# Patient Record
Sex: MALE | Race: WHITE | Employment: FULL TIME | URBAN - METROPOLITAN AREA
[De-identification: names, ages, dates, MRNs, and addresses within clinical notes are randomized per-mention and may not be internally consistent; named-entity substitution may affect disease eponyms.]

---

## 2020-11-20 ENCOUNTER — OFFICE VISIT (OUTPATIENT)
Dept: PRIMARY CARE CLINIC | Age: 25
End: 2020-11-20
Payer: COMMERCIAL

## 2020-11-20 VITALS
DIASTOLIC BLOOD PRESSURE: 68 MMHG | BODY MASS INDEX: 20.54 KG/M2 | OXYGEN SATURATION: 99 % | HEIGHT: 73 IN | SYSTOLIC BLOOD PRESSURE: 110 MMHG | TEMPERATURE: 98 F | RESPIRATION RATE: 16 BRPM | WEIGHT: 155 LBS | HEART RATE: 87 BPM

## 2020-11-20 DIAGNOSIS — Z20.822 SUSPECTED COVID-19 VIRUS INFECTION: ICD-10-CM

## 2020-11-20 LAB
Lab: NORMAL
QC PASS/FAIL: NORMAL
SARS-COV-2, POC: NORMAL

## 2020-11-20 PROCEDURE — 87426 SARSCOV CORONAVIRUS AG IA: CPT | Performed by: PHYSICIAN ASSISTANT

## 2020-11-20 PROCEDURE — 4004F PT TOBACCO SCREEN RCVD TLK: CPT | Performed by: PHYSICIAN ASSISTANT

## 2020-11-20 PROCEDURE — G8484 FLU IMMUNIZE NO ADMIN: HCPCS | Performed by: PHYSICIAN ASSISTANT

## 2020-11-20 PROCEDURE — G8427 DOCREV CUR MEDS BY ELIG CLIN: HCPCS | Performed by: PHYSICIAN ASSISTANT

## 2020-11-20 PROCEDURE — 99213 OFFICE O/P EST LOW 20 MIN: CPT | Performed by: PHYSICIAN ASSISTANT

## 2020-11-20 PROCEDURE — G8420 CALC BMI NORM PARAMETERS: HCPCS | Performed by: PHYSICIAN ASSISTANT

## 2020-11-20 RX ORDER — DEXTROMETHORPHAN HYDROBROMIDE AND PROMETHAZINE HYDROCHLORIDE 15; 6.25 MG/5ML; MG/5ML
5 SYRUP ORAL EVERY 6 HOURS PRN
Qty: 120 ML | Refills: 0 | Status: SHIPPED
Start: 2020-11-20 | End: 2022-04-29

## 2020-11-20 NOTE — LETTER
[address] [place] [address]  Antony Diallo  Phone: [phone]  Fax: [phone]    Rey Dickerson. Jacqui Huntley PA-C      11/20/2020     Patient: Beatriz Mcdaniel   YOB: 1995       To Whom It May Concern: It is my medical opinion that Beatriz Mcdaniel should remain out of work while acutely ill and awaiting COVID-19 test results. Return to work with no retesting should be followed if test is negative AND meets these 3 criteria as outlined by CDC/ODH:     a. No fever without the use of fever reducers for 24 hours  b. Improvement in symptoms  c. At least 7 days since the onset of symptoms (11/27). If tests positive for COVID-19, needs minimum of 10 days strict quarantine, improvement of symptoms and 24 hours fever free without fever reducing medications. If you have any questions or concerns, please don't hesitate to call. Sincerely,        Rey Dickerson.  NEGRITO Lew

## 2020-11-20 NOTE — PROGRESS NOTES
Chief Complaint   Cough (productive x 4 days); Other (loss of taste and smell); and Chest Congestion (rhinorrhea)      History of Present Illness   Source of history provided by: patient. Janine Hollis is a 30 y.o. old male who has a past medical history of: History reviewed. No pertinent past medical history. Presents to the flu clinic for evaluation of productive cough, chest congestion, rhinorrhea, and loss of taste and smell x4 days. Patient denies any known exposure to COVID-19. Denies any history of asthma or tobacco use. Denies any fever, chills, CP, dyspnea, LE edema, abdominal pain, vomiting, rash, or lethargy. ROS   Pertinent positives and negatives are stated within HPI, all other systems reviewed and are negative. Surgical History:  has no past surgical history on file. Social History:    Family History: family history is not on file. Allergies: Patient has no known allergies. Physical Exam      VS:  /68   Pulse 87   Temp 98 °F (36.7 °C) (Temporal)   Resp 16   Ht 6' 0.5\" (1.842 m)   Wt 155 lb (70.3 kg)   SpO2 99%   BMI 20.73 kg/m²    Oxygen Saturation Interpretation: Normal.    Constitutional:  Alert, development consistent with age. NAD. Head:  NC/NT. Airway patent. Mouth: Posterior pharynx with mild erythema and clear postnasal drip. No tonsillar hypertrophy or exudate. Neck:  Normal ROM. Supple. No anterior cervical adenopathy noted. Lungs: CTAB without wheezes, rales, or rhonchi. CV:  Regular rate and rhythm, normal heart sounds, without pathological murmurs, ectopy, gallops, or rubs. Skin:  Normal turgor. Warm, dry, without visible rash. Lymphatic: No lymphangitis or adenopathy noted. Neurological:  Oriented. Motor functions intact. Lab / Imaging Results   (All laboratory and radiology results have been personally reviewed by myself)  Labs:  No results found for this visit on 11/20/20. Imaging:   All Radiology results interpreted by Radiologist unless otherwise noted. No results found. Medical Decision Making   Pt non-toxic, in no apparent distress and stable at time of discharge. Assessment/Plan   Pratima Fermin was seen today for cough, other and chest congestion. Diagnoses and all orders for this visit:    Suspected COVID-19 virus infection  -     POCT COVID-19, Antigen  -     COVID-19 Ambulatory; Future    Other orders  -     promethazine-dextromethorphan (PROMETHAZINE-DM) 6.25-15 MG/5ML syrup; Take 5 mLs by mouth every 6 hours as needed for Cough      Rapid COVID-19 testing is negative in office. Confirmatory PCR swab obtained and pending, will call with results once available. Prescription written for Promethazine DM cough syrup, side effects discussed. Advised cautionary self-quarantine at home in the interim. Pt should remain out of work for at least 10 days from the start of symptoms. Pt should also be fever free for 24 hours and symptoms should be improved overall prior to returning. Increase fluids and rest. Symptomatic relief discussed including Tylenol prn pain/fever. Schedule virtual f/u with PCP in 7-10 days if symptoms persist. ED sooner if symptoms worsen or change. ED immediately with high or refractory fever, progressive SOB, dyspnea, CP, calf pain/swelling, shaking chills, vomiting, abdominal pain, lethargy, flank pain, or decreased urinary output. Pt verbalizes understanding and is in agreement with plan of care. All questions answered. Juan Lew PA-C    This visit was provided as a focused evaluation during the COVID -19 pandemic/national emergency. A comprehensive review of all previous patient history and testing was not conducted. Pertinent findings were elicited during the visit.

## 2020-11-21 LAB
SARS-COV-2: NOT DETECTED
SOURCE: NORMAL

## 2022-03-24 ENCOUNTER — APPOINTMENT (OUTPATIENT)
Dept: GENERAL RADIOLOGY | Age: 27
End: 2022-03-24
Payer: COMMERCIAL

## 2022-03-24 ENCOUNTER — HOSPITAL ENCOUNTER (EMERGENCY)
Age: 27
Discharge: HOME | End: 2022-03-24
Payer: COMMERCIAL

## 2022-03-24 VITALS
BODY MASS INDEX: 19.64 KG/M2 | TEMPERATURE: 98.6 F | SYSTOLIC BLOOD PRESSURE: 136 MMHG | DIASTOLIC BLOOD PRESSURE: 88 MMHG | HEART RATE: 99 BPM | OXYGEN SATURATION: 95 % | WEIGHT: 145 LBS | RESPIRATION RATE: 16 BRPM | HEIGHT: 72 IN

## 2022-03-24 DIAGNOSIS — S83.91XA SPRAIN OF RIGHT KNEE, UNSPECIFIED LIGAMENT, INITIAL ENCOUNTER: ICD-10-CM

## 2022-03-24 DIAGNOSIS — S82.141A CLOSED FRACTURE OF RIGHT TIBIAL PLATEAU, INITIAL ENCOUNTER: Primary | ICD-10-CM

## 2022-03-24 PROCEDURE — 99283 EMERGENCY DEPT VISIT LOW MDM: CPT

## 2022-03-24 PROCEDURE — 6370000000 HC RX 637 (ALT 250 FOR IP): Performed by: NURSE PRACTITIONER

## 2022-03-24 PROCEDURE — 73564 X-RAY EXAM KNEE 4 OR MORE: CPT

## 2022-03-24 RX ORDER — NAPROXEN 500 MG/1
500 TABLET ORAL 2 TIMES DAILY WITH MEALS
Qty: 14 TABLET | Refills: 0 | Status: SHIPPED | OUTPATIENT
Start: 2022-03-24 | End: 2022-07-20

## 2022-03-24 RX ORDER — NAPROXEN 500 MG/1
500 TABLET ORAL ONCE
Status: COMPLETED | OUTPATIENT
Start: 2022-03-24 | End: 2022-03-24

## 2022-03-24 RX ADMIN — NAPROXEN 500 MG: 500 TABLET ORAL at 18:33

## 2022-03-24 ASSESSMENT — PAIN DESCRIPTION - PROGRESSION: CLINICAL_PROGRESSION: NOT CHANGED

## 2022-03-24 ASSESSMENT — PAIN DESCRIPTION - PAIN TYPE: TYPE: ACUTE PAIN

## 2022-03-24 ASSESSMENT — PAIN SCALES - GENERAL
PAINLEVEL_OUTOF10: 7
PAINLEVEL_OUTOF10: 7

## 2022-03-24 ASSESSMENT — PAIN - FUNCTIONAL ASSESSMENT: PAIN_FUNCTIONAL_ASSESSMENT: 0-10

## 2022-03-24 ASSESSMENT — PAIN DESCRIPTION - LOCATION: LOCATION: KNEE

## 2022-03-24 ASSESSMENT — PAIN DESCRIPTION - DESCRIPTORS: DESCRIPTORS: ACHING

## 2022-03-24 ASSESSMENT — PAIN DESCRIPTION - ONSET: ONSET: ON-GOING

## 2022-03-24 ASSESSMENT — PAIN DESCRIPTION - ORIENTATION: ORIENTATION: RIGHT

## 2022-03-24 ASSESSMENT — PAIN DESCRIPTION - FREQUENCY: FREQUENCY: CONTINUOUS

## 2022-03-24 NOTE — ED PROVIDER NOTES
[address]  Department of Emergency Medicine   ED  Encounter Note  Admit Date/RoomTime: 3/24/2022  5:56 PM  ED Room: [facility]    NAME: Uriah Keen  : 1995  MRN: [id number]     Chief Complaint:  Knee Injury (during basket ball today injured right knee rates pain 7)    History of Present Illness       Uriah Keen is a 27 y.o. old male who presents to the emergency department for evaluation of right knee pain that occurred about an hour prior to arrival while playing basketball. Patient states he was passing to the left and pivoting to the right when he felt a pop in his right knee. He denies a history of fracture, dislocation or surgical intervention of this knee in the past.  He has not had any over-the-counter intervention for his pain prior to arrival.  His pain is aggravated by ambulation and movement. He denies any other injury associated with this. His symptoms are moderate in severity and persistent nature. ROS   Pertinent positives and negatives are stated within HPI, all other systems reviewed and are negative. Past Medical History:  has no past medical history on file. Surgical History:  has no past surgical history on file. Social History:  reports that he has been smoking. He has never used smokeless tobacco. He reports previous alcohol use. He reports that he does not use drugs. Family History: family history is not on file. Allergies: Patient has no known allergies. Physical Exam   Oxygen Saturation Interpretation: Normal.        ED Triage Vitals   BP Temp Temp Source Pulse Resp SpO2 Height Weight   22 1753 22 1753 22 1753 22 1753 22 1753 22 1753 22 1757 22 1757   (!) 151/100 98.6 °F (37 °C) Oral 99 16 95 % 6' (1.829 m) 145 lb (65.8 kg)         Constitutional:  Alert, development consistent with age. HEENT:  NC/NT. No outward sign of trauma. Airway patent.   Neck:  Normal ROM.  Supple. Respiratory: No respiratory distress or increased work of breathing. Cardiovascular: Regular rate and rhythm. Strong distal pulses. Right Lower Extremity(s): knee              Tenderness: Moderate tenderness at the lower medial and lateral aspects of the knee as well as the tibial plateau. There is no bony tenderness to the right hip, thigh, mid tib-fib, ankle or foot. There is no posterior leg tenderness. No pain at the Achilles tendon. Swelling: Mild at the lateral aspect of the knee. Calf:  No evidence of DVT seen on physical exam. No cords or calf tenderness. No significant calf/ankle edema. .             Deformity: no deformity observed/palpated. ROM: Negative anterior and posterior drawer. Guarded range of motion secondary to pain. Skin:  no wounds, erythema, or ecchymosis. Neurovascular: Motor deficit: none. Sensory deficit: none. Pulse deficit: none. Capillary refill: normal.  Gait:  limp due to affected limb. Neurological:  Alert and oriented. Motor and sensory functions intact unless otherwise described above. Lab / Imaging Results   (All laboratory and radiology results have been personally reviewed by myself)  Labs:  No results found for this visit on 03/24/22. Imaging: All Radiology results interpreted by Radiologist unless otherwise noted. XR KNEE RIGHT (MIN 4 VIEWS)   Final Result   1. Possible cortical avulsion at the lateral aspect of the lateral tibial   plateau. Recommend correlation for point tenderness in this region. 2. Suprapatellar knee joint effusion. ED Course / Medical Decision Making     Medications   naproxen (NAPROSYN) tablet 500 mg (500 mg Oral Given 3/24/22 1833)          Re-examination:  3/24/22     Time: 1950  Patients symptoms are improving. Repeat physical examination is unchanged.   Discussed results and treatment plan.    Consults:   Time: 1950 Discussed results with Dr. Shyanne Abraham and plan is for knee immobilizer. Procedure(s):  None    MDM:      Neurovascularly intact. Imaging was obtained based as per history and physical exam findings. Interpretation as per radiologist positive for acute findings as documented above. Plan is subsequently for symptom control with knee immobilizer, crutches, anti-inflammatories as he declines opiate medications, rest, ice, elevation and with appropriate outpatient follow-up with orthopedics as provided on their discharge handout. Patient is educated on S/S to watch for indicative of re-evaluation in the ER setting including worsening of current symptoms not responding to the treatment plan. Patient verbalized understanding of instructions and is amenable to this treatment plan. Patient departed in stable condition. Plan of Care/Counseling:  MYLA Hu CNP reviewed today's visit with the patient in addition to providing specific details for the plan of care and counseling regarding the diagnosis and prognosis. Questions are answered at this time and are agreeable with the plan. Assessment      1. Closed fracture of right tibial plateau, initial encounter    2. Sprain of right knee, unspecified ligament, initial encounter      Plan   Discharged home. Patient condition is stable    New Medications     New Prescriptions    NAPROXEN (NAPROSYN) 500 MG TABLET    Take 1 tablet by mouth 2 times daily (with meals) for 7 days     Electronically signed by MYLA Hu CNP   DD: 3/24/22  **This report was transcribed using voice recognition software. Every effort was made to ensure accuracy; however, inadvertent computerized transcription errors may be present.   END OF ED PROVIDER NOTE       MYLA Hu CNP  03/24/22 1954

## 2022-03-24 NOTE — ED NOTES
Ace wrap applied to right knee, good circulation, cap refill < 3 seconds     Cuauhtemoc Macdonald, KEELY  03/24/22 2030

## 2022-03-25 ENCOUNTER — TELEPHONE (OUTPATIENT)
Dept: ORTHOPEDIC SURGERY | Age: 27
End: 2022-03-25

## 2022-03-25 NOTE — TELEPHONE ENCOUNTER
ED f/u 3/24/22 TTS  Chief Complaint:  Knee Injury (during basket ball today injured right knee rates pain 7)  Imaging: All Radiology results interpreted by Radiologist unless otherwise noted. XR KNEE RIGHT (MIN 4 VIEWS)   Final Result   1. Possible cortical avulsion at the lateral aspect of the lateral tibial   plateau. Recommend correlation for point tenderness in this region. 2. Suprapatellar knee joint effusion. Routing to provider for scheduling rec.

## 2022-03-25 NOTE — TELEPHONE ENCOUNTER
Call to pt left message w/Grandma scheduled appt /gave directions to office.   Future Appointments   Date Time Provider Josafat Montenegro   3/29/2022 10:00 AM Luis Enrique Escobar MD [facility]

## 2022-03-25 NOTE — TELEPHONE ENCOUNTER
Pt called in to [facility] an ED F/U for a fx of RT tibial plateau. Alexandra Benavides can be reached at [phone]. Thank you.

## 2022-03-29 ENCOUNTER — OFFICE VISIT (OUTPATIENT)
Dept: ORTHOPEDIC SURGERY | Age: 27
End: 2022-03-29
Payer: COMMERCIAL

## 2022-03-29 DIAGNOSIS — M23.91 INTERNAL DERANGEMENT OF MULTIPLE SITES OF RIGHT KNEE: Primary | ICD-10-CM

## 2022-03-29 PROCEDURE — 99203 OFFICE O/P NEW LOW 30 MIN: CPT | Performed by: ORTHOPAEDIC SURGERY

## 2022-03-29 PROCEDURE — G8427 DOCREV CUR MEDS BY ELIG CLIN: HCPCS | Performed by: ORTHOPAEDIC SURGERY

## 2022-03-29 PROCEDURE — G8484 FLU IMMUNIZE NO ADMIN: HCPCS | Performed by: ORTHOPAEDIC SURGERY

## 2022-03-29 PROCEDURE — 99202 OFFICE O/P NEW SF 15 MIN: CPT | Performed by: ORTHOPAEDIC SURGERY

## 2022-03-29 PROCEDURE — G8420 CALC BMI NORM PARAMETERS: HCPCS | Performed by: ORTHOPAEDIC SURGERY

## 2022-03-29 PROCEDURE — 4004F PT TOBACCO SCREEN RCVD TLK: CPT | Performed by: ORTHOPAEDIC SURGERY

## 2022-03-29 NOTE — PATIENT INSTRUCTIONS
Work on gentle range of motion of the right knee    Baby aspirin daily for clotting prophylaxis    Ice and elevate and NSAIDs for comfort    MRI scheduled    You were ordered MRI of Right knee without contrast by your Orthopedic Provider. If you do not hear from that department please contact: MRI- [phone]    Please make sure your follow up appointment in office is scheduled shortly after the above testing is complete. If your testing is completed significantly sooner than planned follow up contact office for appointment adjustment.

## 2022-03-29 NOTE — PROGRESS NOTES
Chief Complaint   Patient presents with    Knee Pain     right knee; patient was playing basketball and did a move and felt his knee try and go in two different directions; patient felt a pop and fell to the ground; doi: 3/24/2022; no previous injury to this knee; 3/10 pain; patient states he has remained non weight bearing; using crutches to ambulate    Other     patient works at [employer]; patient will need a note for work       SUBJECTIVE: Patient is a very pleasant 64-year-old male comes in today with chief complaint of right knee pain. He is accompanied by his mother today. He states he was playing basketball on 3/24/2022 when he felt a pop in his right knee when he was pivoting. He states that the knee became very sore he had difficulty ambulating very well now. He can bear weight although it hurts. He was seen in the emergency department placed in the immobilizer and sent to us for definitive management. His x-rays emergency department showed a possible capsular avulsion on the lateral side of his tibial plateau. He states the swelling has been resolving nicely. He denies any further injury. He denies any previous injury in the past.  His mother does note that his brother and she have a genetic clotting disorder. History reviewed. No pertinent past medical history. History reviewed. No pertinent surgical history. History reviewed. No pertinent family history. Social History     Tobacco Use    Smoking status: Current Every Day Smoker     Packs/day: 1.00     Types: Cigarettes    Smokeless tobacco: Never Used   Substance Use Topics    Alcohol use: Yes     Comment: patient states he drinks a couple drinks every night    Drug use: Yes     Frequency: 2.0 times per week     Types: Marijuana (Weed)     No Known Allergies\      Review of Systems   Constitutional: Negative for fever, chills, diaphoresis, appetite change and fatigue.    HENT: Negative for dental issues, hearing loss and tinnitus. Negative for congestion, sinus pressure, sneezing, sore throat. Negative for headache. Eyes: Negative for visual disturbance, blurred and double vision. Negative for pain, discharge, redness and itching  Respiratory: Negative for cough, shortness of breath and wheezing. Cardiovascular: Negative for chest pain, palpitations and leg swelling. No dyspnea on exertion   Gastrointestinal:   Negative for nausea, vomiting, abdominal pain, diarrhea, constipation  or black or bloody. Hematologic\Lymphatic:  negative for bleeding, petechiae,   Genitourinary: Negative for hematuria and difficulty urinating. Musculoskeletal: Negative for neck pain and stiffness. Negative for back pain, see HPI  Skin: Negative for pallor, rash and wound. Neurological: Negative for dizziness, tremors, seizures, weakness, light-headedness, no TIA or stroke symptoms. No numbness and headaches. Psychiatric/Behavioral: Negative. OBJECTIVE:      Physical Examination:   General appearance: alert, well appearing, and in no distress,  normal appearing weight. No visible signs of trauma   Mental status: alert, oriented to person, place, and time, normal mood, behavior, speech, dress, motor activity, and thought processes  Abdomen: soft, nondistended  Resp:   resp easy and unlabored, no audible wheezes note, normal symmetrical expansion of both hemithoraces  Cardiac: distal pulses palpable, skin and extremities well perfused  Neurological: alert, oriented X3, normal speech, no focal findings or movement disorder noted, motor and sensory grossly normal bilaterally, normal muscle tone, no tremors, strength 5/5, normal gait and station  HEENT: normochephalic atraumatic, external ears and eyes normal, sclera normal, neck supple, no nasal discharge.    Extremities:   peripheral pulses normal, no edema, redness or tenderness in the calves   Skin: normal coloration, no rashes or open wounds, no suspicious skin lesions noted  Psych: Affect euthymic   Musculoskeletal:   Extremity:  Right knee   Skin intact. Mild effusion noted. Lateral joint line TTP. Some tenderness to palpation of the MCL insertion   Stable to varus and valgus at 30 degrees of flexion. Does feel little looser on the lateral collateral ligament side   Patient does have more laxity on the right knee as far as anterior draw than the left but does have an endpoint. Right knee is stable   Compartments soft and compressible. NVID. 2/4 DP and PT pulses. Patella tracks nicely no crepitus on range of motion   Calves soft and NT.     5/5 EHL, TA, GS. Range of motion is 0 to 90 degrees    There were no vitals taken for this visit. XR: The right knee from the emergency department is reviewed today. Does show possible bony avulsion at the lateral tibial plateau and an effusion. No other obvious fractures or dislocations. ASSESSMENT:     Diagnosis Orders   1. Internal derangement of multiple sites of right knee  MRI KNEE RIGHT WO CONTRAST        Discussion: Had a long discussion with the patient and his mother in detail about the fact that he does feel he has some laxity in his ACL also has a very small joint effusion. With the tenderness along his MCL and some lateral joint line tenderness I like to get an MRI for internal derangement of his knee. This will look at his ACL and menisci as well as his lateral collateral ligaments. She understands that these may have injury. In the meantime told to work on gentle range of motion, the knee immobilizer so he does not get a stiff knee. We will start him on baby aspirin daily for DVT prophylaxis. He is agreeable with the plan.     PLAN:    MRI right knee    Ice and elevate    Work on gentle range of motion right knee come out of immobilizer    Baby aspirin daily for DVT prophylaxis    We will call him if ACL torn will refer to sports otherwise we will see him back in the office for MRI results    ELECTRONICALLY signed by:    Gian Barkley MD  3/29/22

## 2022-03-29 NOTE — LETTER
[address]  [address]  [address] [facility] [address]  Phone: [phone]  Fax: [phone] Nabeel Garcia MD        March 29, 2022     Patient: Bethanie Lennox   YOB: 1995   Date of Visit: 3/29/2022       To Whom It May Concern:    Bethanie Lennox was seen in my office today. It is my medical opinion that Bethanie Lennox should remain out of work until re-evaluated in office after MRI is completed. If you have any questions or concerns, please don't hesitate to call.     Sincerely,          Larisa Gonsalez MD

## 2022-04-15 ENCOUNTER — HOSPITAL ENCOUNTER (OUTPATIENT)
Dept: GENERAL RADIOLOGY | Age: 27
Discharge: HOME | End: 2022-04-17
Payer: COMMERCIAL

## 2022-04-15 ENCOUNTER — HOSPITAL ENCOUNTER (OUTPATIENT)
Dept: MRI IMAGING | Age: 27
Discharge: HOME | End: 2022-04-17
Payer: COMMERCIAL

## 2022-04-15 DIAGNOSIS — T15.90XA FOREIGN BODY IN EYE, UNSPECIFIED LATERALITY, INITIAL ENCOUNTER: ICD-10-CM

## 2022-04-15 DIAGNOSIS — M23.91 INTERNAL DERANGEMENT OF MULTIPLE SITES OF RIGHT KNEE: ICD-10-CM

## 2022-04-15 PROCEDURE — 70030 X-RAY EYE FOR FOREIGN BODY: CPT

## 2022-04-15 PROCEDURE — 73721 MRI JNT OF LWR EXTRE W/O DYE: CPT

## 2022-04-21 ENCOUNTER — TELEPHONE (OUTPATIENT)
Dept: ADMINISTRATIVE | Age: 27
End: 2022-04-21

## 2022-04-21 DIAGNOSIS — S83.519A ACUTE TEAR OF ANTERIOR CRUCIATE LIGAMENT OF KNEE: Primary | ICD-10-CM

## 2022-04-21 NOTE — TELEPHONE ENCOUNTER
MRi right knee completed 04/15/22    MRI knee right wo contrast    Impression   1. Acute ACL tear. 2. Suspected tear in the posterior horn lateral meniscus. 3. Grade 1 sprain of the LCL. 4. Bone contusions in the femoral condyles and posterior tibial plateau. Additional bone contusion in the proximal fibula. Per last office visit note, \"We will call him if ACL torn will refer to sports otherwise we will see him back in the office for MRI results\"    Referral to Dr. Yo Em pended and routed.

## 2022-04-21 NOTE — TELEPHONE ENCOUNTER
Referral to Dr. Arzola Lisseth placed.  Call placed to patient, family member(?) answered phone, instructed pt to call office back to discuss results and referral.

## 2022-04-29 ENCOUNTER — OFFICE VISIT (OUTPATIENT)
Dept: ORTHOPEDIC SURGERY | Age: 27
End: 2022-04-29
Payer: COMMERCIAL

## 2022-04-29 VITALS — BODY MASS INDEX: 19.88 KG/M2 | WEIGHT: 150 LBS | HEIGHT: 73 IN

## 2022-04-29 DIAGNOSIS — S83.511A RUPTURE OF ANTERIOR CRUCIATE LIGAMENT OF RIGHT KNEE, INITIAL ENCOUNTER: Primary | ICD-10-CM

## 2022-04-29 PROCEDURE — 99204 OFFICE O/P NEW MOD 45 MIN: CPT | Performed by: ORTHOPAEDIC SURGERY

## 2022-04-29 PROCEDURE — G8427 DOCREV CUR MEDS BY ELIG CLIN: HCPCS | Performed by: ORTHOPAEDIC SURGERY

## 2022-04-29 PROCEDURE — 4004F PT TOBACCO SCREEN RCVD TLK: CPT | Performed by: ORTHOPAEDIC SURGERY

## 2022-04-29 PROCEDURE — G8420 CALC BMI NORM PARAMETERS: HCPCS | Performed by: ORTHOPAEDIC SURGERY

## 2022-04-29 NOTE — PROGRESS NOTES
New Knee Patient     Referring Provider:   No referring provider defined for this encounter. CHIEF COMPLAINT:   Chief Complaint   Patient presents with    New Patient     R knee ACL injury. Pt was playing basketball 1 month ago, went to pivot and R knee popped and he fell. Had MRI by Dr. Emmanuel Davis showing ACL tear.  Knee Pain     R knee clicking, instability         HPI:    Leeanna Ceja is a 27y.o. year old male who injured his right knee playing basketball about a month ago. He had an MRI which shows an ACL tear. He is here to discuss further treatment. The patient is currently employed at [employer], he has not been working since his injury. He denies any prior knee pain    PAST MEDICAL HISTORY  No past medical history on file. PAST SURGICAL HISTORY  No past surgical history on file. FAMILY HISTORY   No family history on file. SOCIAL HISTORY  Social History     Socioeconomic History    Marital status: Single     Spouse name: Not on file    Number of children: Not on file    Years of education: Not on file    Highest education level: Not on file   Occupational History    Not on file   Tobacco Use    Smoking status: Current Every Day Smoker     Packs/day: 1.00     Types: Cigarettes    Smokeless tobacco: Never Used   Substance and Sexual Activity    Alcohol use: Yes     Comment: patient states he drinks a couple drinks every night    Drug use: Yes     Frequency: 2.0 times per week     Types: Marijuana Natividad Beat)    Sexual activity: Not on file   Other Topics Concern    Not on file   Social History Narrative    Not on file     Social Determinants of Health     Financial Resource Strain:     Difficulty of Paying Living Expenses: Not on file   Food Insecurity:     Worried About Running Out of Food in the Last Year: Not on file    Romy of Food in the Last Year: Not on file   Transportation Needs:     Lack of Transportation (Medical):  Not on file    Lack of Transportation (Non-Medical):  Not on file   Physical Activity:     Days of Exercise per Week: Not on file    Minutes of Exercise per Session: Not on file   Stress:     Feeling of Stress : Not on file   Social Connections:     Frequency of Communication with Friends and Family: Not on file    Frequency of Social Gatherings with Friends and Family: Not on file    Attends Restorationist Services: Not on file    Active Member of [address] [place] [address] [employer] or Organizations: Not on file    Attends Club or Organization Meetings: Not on file    Marital Status: Not on file   Intimate Partner Violence:     Fear of Current or Ex-Partner: Not on file    Emotionally Abused: Not on file    Physically Abused: Not on file    Sexually Abused: Not on file   Housing Stability:     Unable to Pay for Housing in the Last Year: Not on file    Number of [place] in the Last Year: Not on file    Unstable Housing in the Last Year: Not on file     Social History     Occupational History    Not on file   Tobacco Use    Smoking status: Current Every Day Smoker     Packs/day: 1.00     Types: Cigarettes    Smokeless tobacco: Never Used   Substance and Sexual Activity    Alcohol use: Yes     Comment: patient states he drinks a couple drinks every night    Drug use: Yes     Frequency: 2.0 times per week     Types: Marijuana Christine Dasen)    Sexual activity: Not on file       CURRENT MEDICATIONS     Current Outpatient Medications:     naproxen (NAPROSYN) 500 MG tablet, Take 1 tablet by mouth 2 times daily (with meals) for 7 days (Patient not taking: Reported on 4/29/2022), Disp: 14 tablet, Rfl: 0    ALLERGIES  No Known Allergies    Controlled Substances Monitoring:          REVIEW OF SYSTEMS:     Constitutional:  Negative for weight loss, fevers, chills, fatigue  Cardiovascular: Negative for chest pain, palpitations  Pulmonary: Negative for shortness of breath, labored breathing, cough  GI: negative for abdominal pain, nausea, vomitting   MSK: per HPI  Skin: negative for rash, open wounds    All other systems reviewed and are negative         PHYSICAL EXAM     Vitals:    04/29/22 1034   Weight: 150 lb (68 kg)   Height: 6' 1\" (1.854 m)       Height: 6' 1\" (1.854 m)  Weight: [unfilled]  BMI:  Body mass index is 19.79 kg/m². General: The patient is alert and oriented x 3, appears to be stated age and in no distress. HEENT: head is normocephalic, atraumatic. EOMI. Neck: supple, trachea midline, no thyromegaly   Cardiovascular: peripheral pulses palpable. Normal Capillary refill   Respiratory: breathing unlabored, chest expansion symmetric   Skin: no rash, no open wounds, no erythema  Psych: normal affect; mood stable  Neurologic: gait normal, sensation grossly intact in extremities  MSK:        Lower Extremity:   Ipsilateral hip exam shows normal range of motion without pain with impingement testing. Exam of the knee today shows positive Lachman grade 2B. There is mild guarding, mild pain with full extension. Flexion is to 120 degrees, mild pain past this point. Knee is stable to varus and valgus at 0 and 30 degrees. Posterior drawer stable. IMAGING:    XR: Right knee. X-rays show no acute abnormality. MRI shows midsubstance ACL tear, some bone bruising about the femur and tibia, grossly intact collateral and ligaments and PCL as well as no obvious meniscus tear      ASSESSMENT  Right knee ACL tear    PLAN  We discussed his knee today. We went over his MRI and exam which correlate with ACL injury. Given his young age and activity level he was offered surgical management. We discussed this would involve ACL reconstruction. We also discussed nonoperative treatment, however we did discuss risk of further injuring his knee with continued instability. He is leaning towards proceeding with surgery. He was given a copy of the ACL booklet today and he will call us if he would like to proceed with scheduling.   Surgery would involve right knee arthroscopy, ACL reconstruction with autograft.         Glory Maynard MD  Orthopaedic Surgery   4/29/22  10:35 AM

## 2022-05-10 ENCOUNTER — TELEPHONE (OUTPATIENT)
Dept: ORTHOPEDIC SURGERY | Age: 27
End: 2022-05-10

## 2022-05-10 NOTE — TELEPHONE ENCOUNTER
Surgery scheduling discussed with patient, they would like to proceed     Surgery: Right knee arthroscopy, ACL reconstruction with autograft   OR: 5/24 [place]  Vendor: [email or web address]  Block: N/a  CPT: 95148    Pre/post-operative appointments discussed with the patient. Surgical handout given with education discussion, patient aware PAT will also call to go over education prior to surgery. Office extension provided to patient with any further questions.      Authorization submitted to [facility]  Auth: Pending

## 2022-05-12 NOTE — TELEPHONE ENCOUNTER
[facility] -- surgery approved 5/24-8/24/22.   Auth# [id number]

## 2022-05-23 DIAGNOSIS — Z98.890 STATUS POST RECONSTRUCTION OF ANTERIOR CRUCIATE LIGAMENT: Primary | ICD-10-CM

## 2022-05-23 RX ORDER — KETOROLAC TROMETHAMINE 10 MG/1
10 TABLET, FILM COATED ORAL EVERY 6 HOURS
Qty: 8 TABLET | Refills: 0 | Status: SHIPPED
Start: 2022-05-23 | End: 2022-07-20 | Stop reason: SDUPTHER

## 2022-05-23 RX ORDER — HYDROCODONE BITARTRATE AND ACETAMINOPHEN 5; 325 MG/1; MG/1
1 TABLET ORAL EVERY 6 HOURS PRN
Qty: 28 TABLET | Refills: 0 | Status: SHIPPED | OUTPATIENT
Start: 2022-05-23 | End: 2022-05-30

## 2022-05-23 NOTE — PROGRESS NOTES
current drug use. Frequency: 2.00 times per week. Drug: Marijuana Andres Divine). Family History:   No family history on file. REVIEW OF SYSTEMS:  CONSTITUTIONAL:  negative  RESPIRATORY:  negative  CARDIOVASCULAR:  negative  MUSCULOSKELETAL:  negative except for  HPI  NEUROLOGICAL:  negative    PHYSICAL EXAM:     VITALS:  There were no vitals taken for this visit. Gen: AOx3, NAD    Heart:  normal apical pulses, normal S1 and S2 and no edema    Lungs:  No increased work of breathing, good air exchange     Abdomen:  no scars, non-distended and non-tender    Extremities:  No clubbing, cyanosis, or edema    Musculoskeletal: Exam of the right knee shows positive grade 2B Lachman. There is some mild guarding and pain with full extension. Flexion range to about 120 degrees with mild pain past this point. Knee is stable to valgus and varus exams at 0 and 30 degrees. Posterior drawer exam intact      DATA:  CBC: No results found for: WBC, RBC, HGB, HCT, MCV, MCH, MCHC, RDW, PLT, MPV  BMP:  No results found for: NA, K, CL, CO2, BUN, LABALBU, CREATININE, CALCIUM, GFRAA, LABGLOM, GLUCOSE, GLU    Radiology Review: X-rays of the right knee showing no acute abnormality. MRI showing mid substance ACL tear some bone bruising of the femur and tibia, grossly intact collateral ligaments and PCL, no obvious meniscal tear visualized    ASSESSMENT AND PLAN:    1. Patient is a 32 y.o. male with above specified procedure planned right knee arthroscopy ACL reconstruction with autograft with anesthesia    2. Procedure options, risks and benefits reviewed with patient. Patient expresses understanding and has signed consent form to proceed.     3.  Patient and family were provided with pre-op and post-op instructions, prescription medications, and any other supplied needed post op (slings, braces, etc.)      Controlled substances monitoring: possible medication side effects, risk of tolerance and/or dependence, and alternative treatments discussed, no signs of potential drug abuse or diversion identified and OARRS report reviewed today- activity consistent with treatment plan       MYLA Morris-CNP  Orthopaedic Surgery   5/23/22  7:40 AM

## 2022-07-19 DIAGNOSIS — Z98.890 STATUS POST RECONSTRUCTION OF ANTERIOR CRUCIATE LIGAMENT: Primary | ICD-10-CM

## 2022-07-19 RX ORDER — KETOROLAC TROMETHAMINE 10 MG/1
10 TABLET, FILM COATED ORAL EVERY 6 HOURS
Qty: 8 TABLET | Refills: 0 | Status: SHIPPED
Start: 2022-07-19 | End: 2022-09-07

## 2022-07-19 RX ORDER — HYDROCODONE BITARTRATE AND ACETAMINOPHEN 5; 325 MG/1; MG/1
1 TABLET ORAL EVERY 6 HOURS PRN
Qty: 28 TABLET | Refills: 0 | Status: SHIPPED | OUTPATIENT
Start: 2022-07-19 | End: 2022-07-26

## 2022-07-20 ENCOUNTER — OFFICE VISIT (OUTPATIENT)
Dept: ORTHOPEDIC SURGERY | Age: 27
End: 2022-07-20

## 2022-07-20 VITALS — HEIGHT: 73 IN | WEIGHT: 150 LBS | BODY MASS INDEX: 19.88 KG/M2

## 2022-07-20 DIAGNOSIS — Z98.890 STATUS POST RECONSTRUCTION OF ANTERIOR CRUCIATE LIGAMENT: Primary | ICD-10-CM

## 2022-07-20 DIAGNOSIS — S83.511A RUPTURE OF ANTERIOR CRUCIATE LIGAMENT OF RIGHT KNEE, INITIAL ENCOUNTER: ICD-10-CM

## 2022-07-20 PROCEDURE — 99024 POSTOP FOLLOW-UP VISIT: CPT | Performed by: ORTHOPAEDIC SURGERY

## 2022-07-20 NOTE — PROGRESS NOTES
Follow Up Post Operative Visit     Surgery: Right knee arthroscopy ACL reconstruction with autograft  Date: 7/19/2022    Subjective:    Neha Joshi is here for follow up visit s/p above procedure. He is doing well. He reports no overnight issues. Controlled Substances Monitoring:        Physical Exam:    Height: 6' 1\" (1.854 m), Weight: 150 lb (68 kg) (per pt)    General: Alert and oriented x3, no acute distress  Cardiovascular/pulmonary: No labored breathing, peripheral perfusion intact  Musculoskeletal:    Right knee exam incision sites closed edges well approximated surgical sutures intact. Stable postoperative swelling. Neurovascular sensation grossly intact. Imaging: X-ray including 2 views of the right knee show stable postoperative changes with ACL reconstruction buttons and correct alignment    Assessment and Plan: Status post right knee arthroscopy ACL reconstruction with autograft    Today we discussed his right knee. He is 1 day after procedure list above. Today intraoperative pictures and postoperative imaging were reviewed. Patient will begin isometric exercises per the ACL protocol. Weightbearing as tolerated with crutches. He will not submerge incision sites until mature scars are present. Steri-Strip and Tegaderm dressing were applied today. Patient will follow-up in 1 week for suture removal, we will provide a physical therapy prescription at that appointment. LYUDMILA Rushing  Orthopedic Surgery   07/20/22  10:17 AM    Staff Addendum    I have seen and evaluated the patient and agree with the assessment and plan as documented by Barb March CNP. I have performed the key components of the history and physical examination and concur with the findings and plan, and have made changes where appropriate/necessary.           Ernestine Luna MD  [facility] [employer]

## 2022-07-20 NOTE — PROGRESS NOTES
Surgical dressings were removed s/p right knee ACL reconstruction on 7/19/2022. Incision was cleaned with alcohol prep pads. Minimal swelling and bleeding in area. Steri stripes and Tegaderm placed over the area to cover surgical sites x 1 week.     CB

## 2022-07-27 ENCOUNTER — OFFICE VISIT (OUTPATIENT)
Dept: ORTHOPEDIC SURGERY | Age: 27
End: 2022-07-27

## 2022-07-27 VITALS — HEIGHT: 73 IN | BODY MASS INDEX: 19.88 KG/M2 | WEIGHT: 150 LBS

## 2022-07-27 DIAGNOSIS — Z48.89 SUTURE CHECK: ICD-10-CM

## 2022-07-27 DIAGNOSIS — Z98.890 STATUS POST RECONSTRUCTION OF ANTERIOR CRUCIATE LIGAMENT: Primary | ICD-10-CM

## 2022-07-27 PROCEDURE — 99024 POSTOP FOLLOW-UP VISIT: CPT | Performed by: NURSE PRACTITIONER

## 2022-07-27 RX ORDER — HYDROCODONE BITARTRATE AND ACETAMINOPHEN 5; 325 MG/1; MG/1
1 TABLET ORAL EVERY 6 HOURS PRN
Qty: 28 TABLET | Refills: 0 | Status: SHIPPED | OUTPATIENT
Start: 2022-07-27 | End: 2022-08-03

## 2022-07-27 NOTE — PROGRESS NOTES
Follow Up Post Operative Visit     Surgery: Right knee arthroscopy ACL reconstruction with autograft  Date: 7/19/2022    Subjective:    Luis Fernando Cole is here for follow up visit s/p above procedure. He is doing well. He has been compliant with postoperative plan of care. Controlled Substances Monitoring:        Physical Exam:    No data recorded    General: Alert and oriented x3, no acute distress  Cardiovascular/pulmonary: No labored breathing, peripheral perfusion intact  Musculoskeletal:    Right knee exam neurovascular sensation grossly intact. Surgical sutures were removed incision sites are closed edges well approximated with out drainage present today. No signs or symptoms of infection present. Stable postoperative swelling      Imaging: No new imaging obtained today. Previous imaging reviewed    Assessment and Plan: Status post right knee arthroscopy ACL reconstruction with autograft    Today we discussed his right knee. He is 1 week out from procedure listed above. Patient is doing well presents to the office walking with the assistance of crutches. Today surgical sutures were removed new Steri-Strips were applied. Patient was instructed to remove Steri-Strips in 1 week. He will not submerge incision sites until mature scars are present. He can continue with showering for basic hygiene purposes. Provided with an external physical therapy prescription he wishes to attend [facility] in [place]. Postoperative ACL protocol was provided to patient he was instructed handcarried into therapy.   He will follow-up in 6 weeks with Dr. Zoey Ovalle, MYLA-CNP  Orthopedic Surgery   07/27/22  8:39 AM

## 2022-09-07 ENCOUNTER — OFFICE VISIT (OUTPATIENT)
Dept: ORTHOPEDIC SURGERY | Age: 27
End: 2022-09-07

## 2022-09-07 VITALS — BODY MASS INDEX: 19.88 KG/M2 | WEIGHT: 150 LBS | HEIGHT: 73 IN

## 2022-09-07 DIAGNOSIS — Z98.890 STATUS POST RECONSTRUCTION OF ANTERIOR CRUCIATE LIGAMENT: Primary | ICD-10-CM

## 2022-09-07 PROCEDURE — 99024 POSTOP FOLLOW-UP VISIT: CPT | Performed by: ORTHOPAEDIC SURGERY

## 2022-09-07 NOTE — PROGRESS NOTES
Follow Up Post Operative Visit     Surgery: Right knee arthroscopy ACL reconstruction with autograft  Date: 7/19/2022    Subjective:    Luis Fernando Cole is here for follow up visit s/p above procedure. He is doing well. He has been making good progress with PT    Controlled Substances Monitoring:        Physical Exam:    Height: 6' 1\" (1.854 m), Weight: 150 lb (68 kg)    General: Alert and oriented x3, no acute distress  Cardiovascular/pulmonary: No labored breathing, peripheral perfusion intact  Musculoskeletal:    Exam of the knee shows healed incisions. Range of motion is 0 to 140 degrees. Lachman is stable. Knee is stable to varus and valgus. There is no effusion. There is improving quadriceps tone near equivalent to the unaffected side      Imaging: No new images    Assessment and Plan: Status post ACL reconstruction about 6 weeks out  He is doing well. He will continue with PT and progress with strengthening.   We will see him back in 6 weeks    Rosalba Torres MD  Orthopaedic Surgery   9/7/22  8:13 AM

## 2022-10-08 ENCOUNTER — HOSPITAL ENCOUNTER (INPATIENT)
Age: 27
LOS: 2 days | Discharge: HOME | DRG: 055 | End: 2022-10-11
Attending: EMERGENCY MEDICINE | Admitting: SURGERY
Payer: COMMERCIAL

## 2022-10-08 ENCOUNTER — APPOINTMENT (OUTPATIENT)
Dept: GENERAL RADIOLOGY | Age: 27
DRG: 055 | End: 2022-10-08
Payer: COMMERCIAL

## 2022-10-08 ENCOUNTER — APPOINTMENT (OUTPATIENT)
Dept: CT IMAGING | Age: 27
DRG: 055 | End: 2022-10-08
Payer: COMMERCIAL

## 2022-10-08 DIAGNOSIS — I61.9 INTRAPARENCHYMAL HEMORRHAGE OF BRAIN (HCC): ICD-10-CM

## 2022-10-08 DIAGNOSIS — S06.5XAA SUBDURAL HEMATOMA: ICD-10-CM

## 2022-10-08 DIAGNOSIS — S01.01XA LACERATION OF SCALP, INITIAL ENCOUNTER: ICD-10-CM

## 2022-10-08 DIAGNOSIS — I60.9 SUBARACHNOID HEMORRHAGE (HCC): Primary | ICD-10-CM

## 2022-10-08 DIAGNOSIS — S02.119A CLOSED FRACTURE OF LEFT SIDE OF OCCIPITAL BONE, UNSPECIFIED OCCIPITAL FRACTURE TYPE, INITIAL ENCOUNTER (HCC): ICD-10-CM

## 2022-10-08 LAB
ACETAMINOPHEN LEVEL: <5 MCG/ML (ref 10–30)
ALBUMIN SERPL-MCNC: 4.3 G/DL (ref 3.5–5.2)
ALP BLD-CCNC: 53 U/L (ref 40–129)
ALT SERPL-CCNC: 11 U/L (ref 0–40)
ANION GAP SERPL CALCULATED.3IONS-SCNC: 17 MMOL/L (ref 7–16)
AST SERPL-CCNC: 19 U/L (ref 0–39)
BASOPHILS ABSOLUTE: 0.05 E9/L (ref 0–0.2)
BASOPHILS RELATIVE PERCENT: 0.4 % (ref 0–2)
BILIRUB SERPL-MCNC: 0.3 MG/DL (ref 0–1.2)
BUN BLDV-MCNC: 8 MG/DL (ref 6–20)
CALCIUM SERPL-MCNC: 7.9 MG/DL (ref 8.6–10.2)
CHLORIDE BLD-SCNC: 96 MMOL/L (ref 98–107)
CO2: 20 MMOL/L (ref 22–29)
CREAT SERPL-MCNC: 0.9 MG/DL (ref 0.7–1.2)
EOSINOPHILS ABSOLUTE: 0.19 E9/L (ref 0.05–0.5)
EOSINOPHILS RELATIVE PERCENT: 1.6 % (ref 0–6)
ETHANOL: 197 MG/DL (ref 0–0.08)
GFR AFRICAN AMERICAN: >60
GFR NON-AFRICAN AMERICAN: >60 ML/MIN/1.73
GLUCOSE BLD-MCNC: 85 MG/DL (ref 74–99)
HCT VFR BLD CALC: 38.6 % (ref 37–54)
HEMOGLOBIN: 13.6 G/DL (ref 12.5–16.5)
IMMATURE GRANULOCYTES #: 0.1 E9/L
IMMATURE GRANULOCYTES %: 0.8 % (ref 0–5)
LYMPHOCYTES ABSOLUTE: 3.45 E9/L (ref 1.5–4)
LYMPHOCYTES RELATIVE PERCENT: 28.8 % (ref 20–42)
MAGNESIUM: 1.5 MG/DL (ref 1.6–2.6)
MCH RBC QN AUTO: 31.9 PG (ref 26–35)
MCHC RBC AUTO-ENTMCNC: 35.2 % (ref 32–34.5)
MCV RBC AUTO: 90.4 FL (ref 80–99.9)
MONOCYTES ABSOLUTE: 1.01 E9/L (ref 0.1–0.95)
MONOCYTES RELATIVE PERCENT: 8.4 % (ref 2–12)
NEUTROPHILS ABSOLUTE: 7.19 E9/L (ref 1.8–7.3)
NEUTROPHILS RELATIVE PERCENT: 60 % (ref 43–80)
PDW BLD-RTO: 13.1 FL (ref 11.5–15)
PLATELET # BLD: 228 E9/L (ref 130–450)
PMV BLD AUTO: 9.8 FL (ref 7–12)
POTASSIUM SERPL-SCNC: 3.2 MMOL/L (ref 3.5–5)
RBC # BLD: 4.27 E12/L (ref 3.8–5.8)
SALICYLATE, SERUM: <0.3 MG/DL (ref 0–30)
SODIUM BLD-SCNC: 133 MMOL/L (ref 132–146)
TOTAL CK: 101 U/L (ref 20–200)
TOTAL PROTEIN: 6.7 G/DL (ref 6.4–8.3)
TRICYCLIC ANTIDEPRESSANTS SCREEN SERUM: NEGATIVE NG/ML
TROPONIN, HIGH SENSITIVITY: <6 NG/L (ref 0–11)
WBC # BLD: 12 E9/L (ref 4.5–11.5)

## 2022-10-08 PROCEDURE — 85610 PROTHROMBIN TIME: CPT

## 2022-10-08 PROCEDURE — 83735 ASSAY OF MAGNESIUM: CPT

## 2022-10-08 PROCEDURE — 96366 THER/PROPH/DIAG IV INF ADDON: CPT

## 2022-10-08 PROCEDURE — 82550 ASSAY OF CK (CPK): CPT

## 2022-10-08 PROCEDURE — 82077 ASSAY SPEC XCP UR&BREATH IA: CPT

## 2022-10-08 PROCEDURE — 99285 EMERGENCY DEPT VISIT HI MDM: CPT

## 2022-10-08 PROCEDURE — 96375 TX/PRO/DX INJ NEW DRUG ADDON: CPT

## 2022-10-08 PROCEDURE — 6370000000 HC RX 637 (ALT 250 FOR IP)

## 2022-10-08 PROCEDURE — 6360000002 HC RX W HCPCS

## 2022-10-08 PROCEDURE — 85025 COMPLETE CBC W/AUTO DIFF WBC: CPT

## 2022-10-08 PROCEDURE — 72125 CT NECK SPINE W/O DYE: CPT

## 2022-10-08 PROCEDURE — 80053 COMPREHEN METABOLIC PANEL: CPT

## 2022-10-08 PROCEDURE — 80143 DRUG ASSAY ACETAMINOPHEN: CPT

## 2022-10-08 PROCEDURE — 80179 DRUG ASSAY SALICYLATE: CPT

## 2022-10-08 PROCEDURE — 80307 DRUG TEST PRSMV CHEM ANLYZR: CPT

## 2022-10-08 PROCEDURE — 71045 X-RAY EXAM CHEST 1 VIEW: CPT

## 2022-10-08 PROCEDURE — 6360000002 HC RX W HCPCS: Performed by: EMERGENCY MEDICINE

## 2022-10-08 PROCEDURE — 96365 THER/PROPH/DIAG IV INF INIT: CPT

## 2022-10-08 PROCEDURE — 70450 CT HEAD/BRAIN W/O DYE: CPT

## 2022-10-08 PROCEDURE — 84484 ASSAY OF TROPONIN QUANT: CPT

## 2022-10-08 RX ORDER — FENTANYL CITRATE 50 UG/ML
50 INJECTION, SOLUTION INTRAMUSCULAR; INTRAVENOUS ONCE
Status: COMPLETED | OUTPATIENT
Start: 2022-10-09 | End: 2022-10-08

## 2022-10-08 RX ORDER — LEVETIRACETAM 15 MG/ML
1500 INJECTION INTRAVASCULAR ONCE
Status: COMPLETED | OUTPATIENT
Start: 2022-10-08 | End: 2022-10-08

## 2022-10-08 RX ORDER — ACETAMINOPHEN 500 MG
1000 TABLET ORAL ONCE
Status: DISCONTINUED | OUTPATIENT
Start: 2022-10-08 | End: 2022-10-08

## 2022-10-08 RX ORDER — POTASSIUM CHLORIDE 20 MEQ/1
40 TABLET, EXTENDED RELEASE ORAL ONCE
Status: COMPLETED | OUTPATIENT
Start: 2022-10-08 | End: 2022-10-08

## 2022-10-08 RX ORDER — MAGNESIUM SULFATE IN WATER 40 MG/ML
2000 INJECTION, SOLUTION INTRAVENOUS ONCE
Status: COMPLETED | OUTPATIENT
Start: 2022-10-08 | End: 2022-10-09

## 2022-10-08 RX ADMIN — FENTANYL CITRATE 50 MCG: 50 INJECTION, SOLUTION INTRAMUSCULAR; INTRAVENOUS at 23:54

## 2022-10-08 RX ADMIN — LEVETIRACETAM 1500 MG: 15 INJECTION INTRAVENOUS at 23:44

## 2022-10-08 RX ADMIN — MAGNESIUM SULFATE HEPTAHYDRATE 2000 MG: 40 INJECTION, SOLUTION INTRAVENOUS at 23:48

## 2022-10-08 RX ADMIN — POTASSIUM CHLORIDE 40 MEQ: 20 TABLET, EXTENDED RELEASE ORAL at 21:10

## 2022-10-08 ASSESSMENT — PAIN SCALES - GENERAL: PAINLEVEL_OUTOF10: 8

## 2022-10-08 ASSESSMENT — PAIN DESCRIPTION - LOCATION: LOCATION: NECK

## 2022-10-08 ASSESSMENT — PAIN DESCRIPTION - DESCRIPTORS: DESCRIPTORS: ACHING

## 2022-10-08 NOTE — ED PROVIDER NOTES
[facility]  Department of Emergency Medicine     Written by: Rufina Carreon MD  Patient Name: Joe Dale  Attending Provider: Ellis Landin MD  Admit Date: 10/8/2022  7:31 PM  MRN: [id number]                   : 1995        Chief Complaint   Patient presents with    Fall     Pt is unaware of events prior to arrival; states he woke up in the ambulance today with a headache; states he was drinking today; denies any other substance use; GCS 14 at this time; 1 mg narcan given PTA for pinpoint pupils; hx of substance abuse; pupils unequal at this time    - Chief complaint    Patient is a 64-year-old male with past medical history of substance abuse presenting after a fall. Patient states that he is unclear on the circumstances of his fall however he reports that he attempted to get up and fell a total of 4 times prior to being assisted by EMS. He states that the people-year-old with did not help him stand up. Patient only reports drinking 2 beers several hours prior to his fall. Per EMS patient was given 1 mg Narcan for pinpoint pupils. Patient is reporting headache at this time mostly in the posterior head and is uncomfortable in c-collar. Patient reports 1 pack/day smoking history, endorses alcohol use, and endorses smoking marijuana at least 2 times a week. Patient denies numbness, tingling, loss of sensation, weakness, blurry vision, hearing changes, difficulty speaking, difficulty swallowing, neck pain, shoulder pain, arm pain, elbow pain, hip pain, knee pain, ankle pain, leg pain, chest pain, shortness of breath, nausea, vomiting, abdominal pain, hematuria, dysuria, increasing or decreasing urinary frequency, blood in the stool, constipation, diarrhea, recent surgery, recent illness, or sick contacts. Review of Systems   Constitutional:  Negative for activity change, chills, fatigue and fever.    HENT:  Negative for congestion, postnasal drip, rhinorrhea, sinus pressure and sore throat. Eyes:  Negative for photophobia, discharge, redness and visual disturbance. Respiratory:  Negative for cough, shortness of breath and wheezing. Cardiovascular:  Negative for chest pain, palpitations and leg swelling. Gastrointestinal:  Negative for abdominal distention, abdominal pain, blood in stool, constipation, diarrhea, nausea and vomiting. Endocrine: Negative for cold intolerance and heat intolerance. Genitourinary:  Negative for decreased urine volume, difficulty urinating, dysuria, flank pain, frequency, hematuria and urgency. Musculoskeletal:  Negative for arthralgias, back pain, joint swelling and myalgias. Skin:  Negative for rash and wound. Allergic/Immunologic: Negative for immunocompromised state. Neurological:  Positive for headaches. Negative for dizziness, syncope, facial asymmetry, weakness, light-headedness and numbness. Hematological:  Does not bruise/bleed easily. Psychiatric/Behavioral:  Negative for behavioral problems and hallucinations. Physical Exam  Constitutional:       General: He is awake. He is not in acute distress. Appearance: Normal appearance. He is well-developed and well-groomed. He is not ill-appearing, toxic-appearing or diaphoretic. HENT:      Head: Normocephalic. Laceration present. No raccoon eyes, Nguyễn's sign, abrasion, contusion, masses, right periorbital erythema or left periorbital erythema. Hair is normal.      Jaw: No trismus or swelling. Right Ear: Tympanic membrane, ear canal and external ear normal. No hemotympanum. Left Ear: Tympanic membrane, ear canal and external ear normal. No hemotympanum. Nose: Nose normal. No nasal deformity, septal deviation, signs of injury, laceration, nasal tenderness, congestion or rhinorrhea. Right Nostril: No foreign body, epistaxis or septal hematoma. Left Nostril: No foreign body, epistaxis or septal hematoma.       Mouth/Throat:      Lips: Pink.      Mouth: Mucous membranes are moist. No injury, lacerations, oral lesions or angioedema. Tongue: No lesions. Tongue does not deviate from midline. Pharynx: Oropharynx is clear. Uvula midline. No pharyngeal swelling or uvula swelling. Eyes:      General: Lids are normal. Vision grossly intact. Gaze aligned appropriately. No visual field deficit. Extraocular Movements: Extraocular movements intact. Right eye: Normal extraocular motion and no nystagmus. Left eye: Normal extraocular motion and no nystagmus. Conjunctiva/sclera: Conjunctivae normal.      Pupils: Pupils are equal, round, and reactive to light. Neck:      Meningeal: Brudzinski's sign absent. Cardiovascular:      Rate and Rhythm: Normal rate and regular rhythm. Pulses: Normal pulses. Radial pulses are 2+ on the right side and 2+ on the left side. Posterior tibial pulses are 2+ on the right side and 2+ on the left side. Heart sounds: Normal heart sounds. Pulmonary:      Effort: Pulmonary effort is normal. No tachypnea, accessory muscle usage or respiratory distress. Breath sounds: Normal breath sounds. No stridor. No decreased breath sounds, wheezing, rhonchi or rales. Chest:      Chest wall: No tenderness. Abdominal:      General: Abdomen is flat. Bowel sounds are normal. There is no distension. Palpations: Abdomen is soft. There is no fluid wave. Tenderness: There is no abdominal tenderness. There is no right CVA tenderness, left CVA tenderness, guarding or rebound. Musculoskeletal:         General: Normal range of motion. Right shoulder: Normal. No swelling, laceration, tenderness, bony tenderness or crepitus. Normal range of motion. Normal pulse. Left shoulder: Normal. No swelling, laceration, tenderness, bony tenderness or crepitus. Normal range of motion. Normal pulse.       Right upper arm: No swelling, deformity, lacerations, tenderness or bony tenderness. Left upper arm: No swelling, deformity, lacerations, tenderness or bony tenderness. Right elbow: No swelling, deformity or lacerations. Normal range of motion. No tenderness. Left elbow: No swelling, deformity or lacerations. Normal range of motion. No tenderness. Right forearm: No swelling, deformity, lacerations, tenderness or bony tenderness. Left forearm: No swelling, deformity, lacerations, tenderness or bony tenderness. Right wrist: No swelling, deformity, lacerations, tenderness or crepitus. Normal range of motion. Normal pulse. Left wrist: No swelling, deformity, lacerations, tenderness or crepitus. Normal range of motion. Normal pulse. Cervical back: Full passive range of motion without pain, normal range of motion and neck supple. No swelling, edema, deformity, erythema, signs of trauma, lacerations, rigidity, tenderness, bony tenderness or crepitus. No pain with movement, spinous process tenderness or muscular tenderness. Normal range of motion. Thoracic back: No swelling, edema, deformity, signs of trauma, lacerations, tenderness or bony tenderness. Normal range of motion. Lumbar back: No swelling, edema, deformity, signs of trauma, lacerations, spasms, tenderness or bony tenderness. Normal range of motion. Right hip: No deformity, tenderness, bony tenderness or crepitus. Normal range of motion. Left hip: No deformity, tenderness, bony tenderness or crepitus. Normal range of motion. Right upper leg: No swelling, deformity, tenderness or bony tenderness. Left upper leg: No swelling, deformity, tenderness or bony tenderness. Right knee: No swelling, deformity or bony tenderness. Normal range of motion. No tenderness. Left knee: No swelling, deformity or bony tenderness. Normal range of motion. No tenderness. Right lower leg: No edema. Left lower leg: No edema. Right ankle: No swelling or deformity.  No tenderness. Normal range of motion. Normal pulse. Left ankle: No swelling or deformity. No tenderness. Normal range of motion. Normal pulse. Lymphadenopathy:      Cervical: No cervical adenopathy. Skin:     General: Skin is warm and dry. Capillary Refill: Capillary refill takes less than 2 seconds. Neurological:      General: No focal deficit present. Mental Status: He is alert and oriented to person, place, and time. Mental status is at baseline. GCS: GCS eye subscore is 4. GCS verbal subscore is 5. GCS motor subscore is 6. Cranial Nerves: Cranial nerves 2-12 are intact. No cranial nerve deficit, dysarthria or facial asymmetry. Sensory: Sensation is intact. No sensory deficit. Motor: Motor function is intact. No weakness, tremor, atrophy, abnormal muscle tone, seizure activity or pronator drift. Coordination: Coordination is intact. Coordination normal. Finger-Nose-Finger Test and Heel to [facility] normal.      Comments: NIHSS 0   Psychiatric:         Attention and Perception: Attention and perception normal.         Mood and Affect: Mood normal.         Behavior: Behavior normal. Behavior is cooperative. Thought Content: Thought content normal.          Procedures       MDM  Number of Diagnoses or Management Options  Closed fracture of left side of occipital bone, unspecified occipital fracture type, initial encounter ([address]  Intraparenchymal hemorrhage of brain (HCC)  Laceration of scalp, initial encounter  Subarachnoid hemorrhage ([address]  Subdural hematoma  Diagnosis management comments: Patient is a 68-year-old male with past medical history of substance abuse presenting after a fall. At the time of initial examination the patient is hypertensive but otherwise hemodynamically stable. Labs are reviewed as below. Patient has an elevated ethanol level. Patient was noted to be hypokalemic and hypomagnesemic and given supplemental potassium and magnesium.   CT head shows left occipital bone fracture extending through the occipital condyle and into the left aspect of the foramen magnum with posterior scalp contusion. There is also a left frontal lobe and to a left circumflex stent medial right frontal lobe subdural hematoma extending into the anterior falx. There are focal areas of subarachnoid hemorrhage involving the left frontal lobe and left temporal lobe and medial right frontal lobe with parenchymal contusions in these regions. No evidence of midline shift or herniation. Chest x-ray shows no acute process. Patient was reevaluated and is still uncomfortable at this time. We will give him Keppra for seizure prophylaxis and fentanyl for his pain. Trauma team was consulted. Patient demonstrated good understanding of his condition at this time. Disposition per trauma team.           ----------------------------------------------- PAST HISTORY --------------------------------------------  Past Medical History:  has no past medical history on file. Past Surgical History:  has no past surgical history on file. Social History:  reports that he has been smoking cigarettes. He has been smoking an average of 1 pack per day. He has never used smokeless tobacco. He reports current alcohol use. He reports current drug use. Frequency: 2.00 times per week. Drug: Marijuana Garrel Calender). Family History: family history is not on file. The patients home medications have been reviewed. Allergies: Patient has no known allergies.     ------------------------------------------------ RESULTS ---------------------------------------------------    Labs:  Results for orders placed or performed during the hospital encounter of 10/08/22   Serum Drug Screen   Result Value Ref Range    Ethanol Lvl 197 mg/dL    Acetaminophen Level <5.0 (L) 10.0 - 81.3 mcg/mL    Salicylate, Serum <8.8 0.0 - 30.0 mg/dL    TCA Scrn NEGATIVE Cutoff:300 ng/mL   CK   Result Value Ref Range    Total CK 101 20 - 200 U/L   Troponin   Result Value Ref Range    Troponin, High Sensitivity <6 0 - 11 ng/L   CBC with Auto Differential   Result Value Ref Range    WBC 12.0 (H) 4.5 - 11.5 E9/L    RBC 4.27 3.80 - 5.80 E12/L    Hemoglobin 13.6 12.5 - 16.5 g/dL    Hematocrit 38.6 37.0 - 54.0 %    MCV 90.4 80.0 - 99.9 fL    MCH 31.9 26.0 - 35.0 pg    MCHC 35.2 (H) 32.0 - 34.5 %    RDW 13.1 11.5 - 15.0 fL    Platelets 327 542 - 839 E9/L    MPV 9.8 7.0 - 12.0 fL    Neutrophils % 60.0 43.0 - 80.0 %    Immature Granulocytes % 0.8 0.0 - 5.0 %    Lymphocytes % 28.8 20.0 - 42.0 %    Monocytes % 8.4 2.0 - 12.0 %    Eosinophils % 1.6 0.0 - 6.0 %    Basophils % 0.4 0.0 - 2.0 %    Neutrophils Absolute 7.19 1.80 - 7.30 E9/L    Immature Granulocytes # 0.10 E9/L    Lymphocytes Absolute 3.45 1.50 - 4.00 E9/L    Monocytes Absolute 1.01 (H) 0.10 - 0.95 E9/L    Eosinophils Absolute 0.19 0.05 - 0.50 E9/L    Basophils Absolute 0.05 0.00 - 0.20 E9/L   Comprehensive Metabolic Panel   Result Value Ref Range    Sodium 133 132 - 146 mmol/L    Potassium 3.2 (L) 3.5 - 5.0 mmol/L    Chloride 96 (L) 98 - 107 mmol/L    CO2 20 (L) 22 - 29 mmol/L    Anion Gap 17 (H) 7 - 16 mmol/L    Glucose 85 74 - 99 mg/dL    BUN 8 6 - 20 mg/dL    Creatinine 0.9 0.7 - 1.2 mg/dL    GFR Non-African American >60 >=60 mL/min/1.73    GFR African American >60     Calcium 7.9 (L) 8.6 - 10.2 mg/dL    Total Protein 6.7 6.4 - 8.3 g/dL    Albumin 4.3 3.5 - 5.2 g/dL    Total Bilirubin 0.3 0.0 - 1.2 mg/dL    Alkaline Phosphatase 53 40 - 129 U/L    ALT 11 0 - 40 U/L    AST 19 0 - 39 U/L   Magnesium   Result Value Ref Range    Magnesium 1.5 (L) 1.6 - 2.6 mg/dL   Protime-INR   Result Value Ref Range    Protime 10.6 9.3 - 12.4 sec    INR 1.0      Imaging: All Radiology results interpreted by Radiologist unless otherwise noted. CT Head WO Contrast   Final Result   1. Left occipital bone fracture extends through the occipital condyle and   into the left aspect of the foramen magnum. Posterior scalp contusion. 2.  Left frontal lobe and to a lesser extent medial right frontal lobe   subdural hematoma extending into the anterior falx. There are focal areas of   subarachnoid hemorrhage involving the left frontal lobe, left temporal lobe,   and medial right frontal lobe with parenchymal contusions in these regions. No evidence of midline shift or herniation at this time. 3.  No acute osseous findings in the cervical spine. Straightening of the   normal cervical lordosis. Vertebral body heights maintained. 4.  No definite evidence of posterior fossa hemorrhage. No definite evidence   of sinus thrombosis at this time. Critical results were called by Dr. Joo Jacobson to Dr. Areli Lange On 10/8/2022   at 23:17. RECOMMENDATIONS:   Recommend close clinical follow-up and short interval repeat CT scan to   assess for progression of hemorrhage. CT Cervical Spine WO Contrast   Final Result   1. Left occipital bone fracture extends through the occipital condyle and   into the left aspect of the foramen magnum. Posterior scalp contusion. 2.  Left frontal lobe and to a lesser extent medial right frontal lobe   subdural hematoma extending into the anterior falx. There are focal areas of   subarachnoid hemorrhage involving the left frontal lobe, left temporal lobe,   and medial right frontal lobe with parenchymal contusions in these regions. No evidence of midline shift or herniation at this time. 3.  No acute osseous findings in the cervical spine. Straightening of the   normal cervical lordosis. Vertebral body heights maintained. 4.  No definite evidence of posterior fossa hemorrhage. No definite evidence   of sinus thrombosis at this time. Critical results were called by Dr. Joo Jacobson to Dr. Areli Lange On 10/8/2022   at 23:17.       RECOMMENDATIONS:   Recommend close clinical follow-up and short interval repeat CT scan to   assess for progression of hemorrhage. XR CHEST PORTABLE   Final Result   No acute process. ---------------------------- NURSING NOTES AND VITALS REVIEWED -------------------------   The nursing notes within the ED encounter and vital signs as below have been reviewed. BP (!) 101/52   Pulse 92   Temp 98.2 °F (36.8 °C)   Resp 16   Ht 6' (1.829 m)   Wt 155 lb (70.3 kg)   SpO2 99%   BMI 21.02 kg/m²   Oxygen Saturation Interpretation: Normal      ------------------------------------------PROGRESS NOTES -------------------------------------------    ED COURSE MEDICATIONS:                Medications   potassium chloride (KLOR-CON M) extended release tablet 40 mEq (40 mEq Oral Given 10/8/22 2110)   levETIRAcetam (KEPPRA) 1500 mg/100 mL IVPB (0 mg IntraVENous Stopped 10/8/22 2351)   magnesium sulfate 2000 mg in 50 mL IVPB premix (0 mg IntraVENous Stopped 10/9/22 0138)   fentaNYL (SUBLIMAZE) injection 50 mcg (50 mcg IntraVENous Given 10/8/22 2354)       RE-Evaluation(s):  Outlined in the ED Course. CONSULTATIONS:            Trauma service. PROCEDURES:            None. COUNSELING:   I have spoken with the grandmother and patient and discussed todays results, in addition to providing specific details for the plan of care and counseling regarding the diagnosis and prognosis.     --------------------------------------- IMPRESSION & DISPOSITION --------------------------------     IMPRESSION(s):  1. Subarachnoid hemorrhage (HCC)    2. Closed fracture of left side of occipital bone, unspecified occipital fracture type, initial encounter ([facility])    3. Subdural hematoma    4. Intraparenchymal hemorrhage of brain ([facility]    5.  Laceration of scalp, initial encounter        This patient's ED course included: a personal history and physicial examination, re-evaluation prior to disposition, multiple bedside re-evaluations, IV medications, cardiac monitoring, continuous pulse oximetry, and complex medical decision making and emergency management    This patient has remained hemodynamically stable and been closely monitored during their ED course. DISPOSITION:  Disposition: Per trauma recommendations. Patient condition is stable. END OF PROVIDER NOTE. Patient was seen and evaluated by myself and my attending Eloisa Carias MD. Assessment and Plan discussed with attending provider, please see attestation for final plan of care.      MD Anita Pop MD  Resident  10/09/22 [id number]

## 2022-10-09 ENCOUNTER — APPOINTMENT (OUTPATIENT)
Dept: CT IMAGING | Age: 27
DRG: 055 | End: 2022-10-09
Payer: COMMERCIAL

## 2022-10-09 ENCOUNTER — APPOINTMENT (OUTPATIENT)
Dept: GENERAL RADIOLOGY | Age: 27
DRG: 055 | End: 2022-10-09
Payer: COMMERCIAL

## 2022-10-09 PROBLEM — S06.5XAA SDH (SUBDURAL HEMATOMA): Status: ACTIVE | Noted: 2022-10-09

## 2022-10-09 LAB
ABO/RH: NORMAL
AMPHETAMINE SCREEN, URINE: NOT DETECTED
ANION GAP SERPL CALCULATED.3IONS-SCNC: 14 MMOL/L (ref 7–16)
ANTIBODY SCREEN: NORMAL
BARBITURATE SCREEN URINE: NOT DETECTED
BASOPHILS ABSOLUTE: 0.04 E9/L (ref 0–0.2)
BASOPHILS RELATIVE PERCENT: 0.3 % (ref 0–2)
BENZODIAZEPINE SCREEN, URINE: NOT DETECTED
BUN BLDV-MCNC: 5 MG/DL (ref 6–20)
CALCIUM SERPL-MCNC: 8.2 MG/DL (ref 8.6–10.2)
CANNABINOID SCREEN URINE: NOT DETECTED
CHLORIDE BLD-SCNC: 97 MMOL/L (ref 98–107)
CO2: 21 MMOL/L (ref 22–29)
COCAINE METABOLITE SCREEN URINE: NOT DETECTED
CREAT SERPL-MCNC: 0.8 MG/DL (ref 0.7–1.2)
EOSINOPHILS ABSOLUTE: 0 E9/L (ref 0.05–0.5)
EOSINOPHILS RELATIVE PERCENT: 0 % (ref 0–6)
FENTANYL SCREEN, URINE: POSITIVE
GFR AFRICAN AMERICAN: >60
GFR NON-AFRICAN AMERICAN: >60 ML/MIN/1.73
GLUCOSE BLD-MCNC: 92 MG/DL (ref 74–99)
HCT VFR BLD CALC: 42.1 % (ref 37–54)
HEMOGLOBIN: 14 G/DL (ref 12.5–16.5)
IMMATURE GRANULOCYTES #: 0.06 E9/L
IMMATURE GRANULOCYTES %: 0.5 % (ref 0–5)
INR BLD: 1
LYMPHOCYTES ABSOLUTE: 0.98 E9/L (ref 1.5–4)
LYMPHOCYTES RELATIVE PERCENT: 7.4 % (ref 20–42)
Lab: ABNORMAL
MCH RBC QN AUTO: 32 PG (ref 26–35)
MCHC RBC AUTO-ENTMCNC: 33.3 % (ref 32–34.5)
MCV RBC AUTO: 96.3 FL (ref 80–99.9)
METHADONE SCREEN, URINE: NOT DETECTED
MONOCYTES ABSOLUTE: 1.02 E9/L (ref 0.1–0.95)
MONOCYTES RELATIVE PERCENT: 7.7 % (ref 2–12)
NEUTROPHILS ABSOLUTE: 11.15 E9/L (ref 1.8–7.3)
NEUTROPHILS RELATIVE PERCENT: 84.1 % (ref 43–80)
OPIATE SCREEN URINE: NOT DETECTED
OXYCODONE URINE: NOT DETECTED
PDW BLD-RTO: 13 FL (ref 11.5–15)
PHENCYCLIDINE SCREEN URINE: NOT DETECTED
PLATELET # BLD: 227 E9/L (ref 130–450)
PMV BLD AUTO: 10.1 FL (ref 7–12)
POTASSIUM REFLEX MAGNESIUM: 4.1 MMOL/L (ref 3.5–5)
PROTHROMBIN TIME: 10.6 SEC (ref 9.3–12.4)
RBC # BLD: 4.37 E12/L (ref 3.8–5.8)
SODIUM BLD-SCNC: 132 MMOL/L (ref 132–146)
WBC # BLD: 13.3 E9/L (ref 4.5–11.5)

## 2022-10-09 PROCEDURE — 72170 X-RAY EXAM OF PELVIS: CPT

## 2022-10-09 PROCEDURE — 36415 COLL VENOUS BLD VENIPUNCTURE: CPT

## 2022-10-09 PROCEDURE — 80048 BASIC METABOLIC PNL TOTAL CA: CPT

## 2022-10-09 PROCEDURE — 85025 COMPLETE CBC W/AUTO DIFF WBC: CPT

## 2022-10-09 PROCEDURE — 99223 1ST HOSP IP/OBS HIGH 75: CPT | Performed by: SURGERY

## 2022-10-09 PROCEDURE — 86900 BLOOD TYPING SEROLOGIC ABO: CPT

## 2022-10-09 PROCEDURE — 6360000004 HC RX CONTRAST MEDICATION: Performed by: RADIOLOGY

## 2022-10-09 PROCEDURE — 2060000000 HC ICU INTERMEDIATE R&B

## 2022-10-09 PROCEDURE — 86901 BLOOD TYPING SEROLOGIC RH(D): CPT

## 2022-10-09 PROCEDURE — 70498 CT ANGIOGRAPHY NECK: CPT

## 2022-10-09 PROCEDURE — 80307 DRUG TEST PRSMV CHEM ANLYZR: CPT

## 2022-10-09 PROCEDURE — 86850 RBC ANTIBODY SCREEN: CPT

## 2022-10-09 PROCEDURE — 2580000003 HC RX 258: Performed by: RADIOLOGY

## 2022-10-09 PROCEDURE — 70450 CT HEAD/BRAIN W/O DYE: CPT

## 2022-10-09 PROCEDURE — 6370000000 HC RX 637 (ALT 250 FOR IP)

## 2022-10-09 PROCEDURE — 2580000003 HC RX 258

## 2022-10-09 RX ORDER — SODIUM CHLORIDE 9 MG/ML
INJECTION, SOLUTION INTRAVENOUS CONTINUOUS
Status: DISCONTINUED | OUTPATIENT
Start: 2022-10-09 | End: 2022-10-09 | Stop reason: SDUPTHER

## 2022-10-09 RX ORDER — ONDANSETRON 4 MG/1
4 TABLET, ORALLY DISINTEGRATING ORAL EVERY 8 HOURS PRN
Status: DISCONTINUED | OUTPATIENT
Start: 2022-10-09 | End: 2022-10-11 | Stop reason: HOSPADM

## 2022-10-09 RX ORDER — NICOTINE 21 MG/24HR
1 PATCH, TRANSDERMAL 24 HOURS TRANSDERMAL DAILY
Status: DISCONTINUED | OUTPATIENT
Start: 2022-10-10 | End: 2022-10-10

## 2022-10-09 RX ORDER — OXYCODONE HYDROCHLORIDE 5 MG/1
5 TABLET ORAL EVERY 4 HOURS PRN
Status: DISCONTINUED | OUTPATIENT
Start: 2022-10-09 | End: 2022-10-11 | Stop reason: HOSPADM

## 2022-10-09 RX ORDER — LABETALOL HYDROCHLORIDE 5 MG/ML
10 INJECTION, SOLUTION INTRAVENOUS EVERY 30 MIN PRN
Status: DISCONTINUED | OUTPATIENT
Start: 2022-10-09 | End: 2022-10-11 | Stop reason: HOSPADM

## 2022-10-09 RX ORDER — HYDRALAZINE HYDROCHLORIDE 20 MG/ML
5 INJECTION INTRAMUSCULAR; INTRAVENOUS EVERY 30 MIN PRN
Status: DISCONTINUED | OUTPATIENT
Start: 2022-10-09 | End: 2022-10-11 | Stop reason: HOSPADM

## 2022-10-09 RX ORDER — LEVETIRACETAM 500 MG/1
500 TABLET ORAL 2 TIMES DAILY
Status: DISCONTINUED | OUTPATIENT
Start: 2022-10-09 | End: 2022-10-11 | Stop reason: HOSPADM

## 2022-10-09 RX ORDER — OXYCODONE HYDROCHLORIDE 10 MG/1
10 TABLET ORAL EVERY 4 HOURS PRN
Status: DISCONTINUED | OUTPATIENT
Start: 2022-10-09 | End: 2022-10-11 | Stop reason: HOSPADM

## 2022-10-09 RX ORDER — POLYETHYLENE GLYCOL 3350 17 G/17G
17 POWDER, FOR SOLUTION ORAL DAILY PRN
Status: DISCONTINUED | OUTPATIENT
Start: 2022-10-09 | End: 2022-10-11 | Stop reason: HOSPADM

## 2022-10-09 RX ORDER — ACETAMINOPHEN 500 MG
1000 TABLET ORAL EVERY 8 HOURS PRN
Status: DISCONTINUED | OUTPATIENT
Start: 2022-10-09 | End: 2022-10-11 | Stop reason: HOSPADM

## 2022-10-09 RX ORDER — SODIUM CHLORIDE 9 MG/ML
INJECTION, SOLUTION INTRAVENOUS PRN
Status: DISCONTINUED | OUTPATIENT
Start: 2022-10-09 | End: 2022-10-11 | Stop reason: HOSPADM

## 2022-10-09 RX ORDER — DIAPER,BRIEF,INFANT-TODD,DISP
EACH MISCELLANEOUS 3 TIMES DAILY
Status: DISCONTINUED | OUTPATIENT
Start: 2022-10-09 | End: 2022-10-11 | Stop reason: HOSPADM

## 2022-10-09 RX ORDER — SENNA AND DOCUSATE SODIUM 50; 8.6 MG/1; MG/1
1 TABLET, FILM COATED ORAL 2 TIMES DAILY
Status: DISCONTINUED | OUTPATIENT
Start: 2022-10-09 | End: 2022-10-11 | Stop reason: HOSPADM

## 2022-10-09 RX ORDER — SODIUM CHLORIDE 0.9 % (FLUSH) 0.9 %
10 SYRINGE (ML) INJECTION PRN
Status: DISCONTINUED | OUTPATIENT
Start: 2022-10-09 | End: 2022-10-11 | Stop reason: HOSPADM

## 2022-10-09 RX ORDER — SODIUM CHLORIDE 9 MG/ML
INJECTION, SOLUTION INTRAVENOUS CONTINUOUS
Status: DISCONTINUED | OUTPATIENT
Start: 2022-10-09 | End: 2022-10-10

## 2022-10-09 RX ORDER — SODIUM CHLORIDE 0.9 % (FLUSH) 0.9 %
5-40 SYRINGE (ML) INJECTION PRN
Status: DISCONTINUED | OUTPATIENT
Start: 2022-10-09 | End: 2022-10-11 | Stop reason: HOSPADM

## 2022-10-09 RX ORDER — SODIUM CHLORIDE 0.9 % (FLUSH) 0.9 %
5-40 SYRINGE (ML) INJECTION EVERY 12 HOURS SCHEDULED
Status: DISCONTINUED | OUTPATIENT
Start: 2022-10-09 | End: 2022-10-11 | Stop reason: HOSPADM

## 2022-10-09 RX ORDER — ONDANSETRON 2 MG/ML
4 INJECTION INTRAMUSCULAR; INTRAVENOUS EVERY 6 HOURS PRN
Status: DISCONTINUED | OUTPATIENT
Start: 2022-10-09 | End: 2022-10-11 | Stop reason: HOSPADM

## 2022-10-09 RX ADMIN — SENNOSIDES AND DOCUSATE SODIUM 1 TABLET: 50; 8.6 TABLET ORAL at 21:19

## 2022-10-09 RX ADMIN — OXYCODONE HYDROCHLORIDE 10 MG: 10 TABLET ORAL at 21:19

## 2022-10-09 RX ADMIN — SENNOSIDES AND DOCUSATE SODIUM 1 TABLET: 50; 8.6 TABLET ORAL at 09:01

## 2022-10-09 RX ADMIN — ACETAMINOPHEN 1000 MG: 500 TABLET ORAL at 21:19

## 2022-10-09 RX ADMIN — Medication 10 ML: at 03:58

## 2022-10-09 RX ADMIN — IOPAMIDOL 75 ML: 755 INJECTION, SOLUTION INTRAVENOUS at 03:58

## 2022-10-09 RX ADMIN — BACITRACIN ZINC: 500 OINTMENT TOPICAL at 09:01

## 2022-10-09 RX ADMIN — LEVETIRACETAM 500 MG: 500 TABLET, FILM COATED ORAL at 09:01

## 2022-10-09 RX ADMIN — ACETAMINOPHEN 1000 MG: 500 TABLET ORAL at 05:23

## 2022-10-09 RX ADMIN — OXYCODONE HYDROCHLORIDE 10 MG: 10 TABLET ORAL at 09:21

## 2022-10-09 RX ADMIN — SODIUM CHLORIDE: 9 INJECTION, SOLUTION INTRAVENOUS at 04:48

## 2022-10-09 RX ADMIN — LEVETIRACETAM 500 MG: 500 TABLET, FILM COATED ORAL at 21:19

## 2022-10-09 RX ADMIN — OXYCODONE HYDROCHLORIDE 10 MG: 10 TABLET ORAL at 05:23

## 2022-10-09 RX ADMIN — OXYCODONE HYDROCHLORIDE 10 MG: 10 TABLET ORAL at 17:14

## 2022-10-09 ASSESSMENT — ENCOUNTER SYMPTOMS
WHEEZING: 0
VOMITING: 0
BACK PAIN: 0
BLOOD IN STOOL: 0
COUGH: 0
EYE DISCHARGE: 0
DIARRHEA: 0
ABDOMINAL DISTENTION: 0
PHOTOPHOBIA: 0
NAUSEA: 0
ABDOMINAL PAIN: 0
EYE REDNESS: 0
SINUS PRESSURE: 0
CONSTIPATION: 0
SORE THROAT: 0
SHORTNESS OF BREATH: 0
RHINORRHEA: 0

## 2022-10-09 ASSESSMENT — PAIN SCALES - GENERAL
PAINLEVEL_OUTOF10: 10
PAINLEVEL_OUTOF10: 7
PAINLEVEL_OUTOF10: 7
PAINLEVEL_OUTOF10: 10

## 2022-10-09 ASSESSMENT — PAIN DESCRIPTION - DESCRIPTORS
DESCRIPTORS: ACHING
DESCRIPTORS: THROBBING;TENDER
DESCRIPTORS: ACHING

## 2022-10-09 ASSESSMENT — PAIN DESCRIPTION - ORIENTATION
ORIENTATION: POSTERIOR

## 2022-10-09 ASSESSMENT — PAIN DESCRIPTION - LOCATION
LOCATION: HEAD

## 2022-10-09 ASSESSMENT — VISUAL ACUITY: OU: 1

## 2022-10-09 NOTE — H&P
TRAUMA HISTORY & PHYSICAL  Surgical Resident/Advance Practice Nurse  10/9/2022  2:59 AM    PRIMARY SURVEY    CHIEF COMPLAINT:  Trauma consult. Injury occurred at approximately 6 PM.  Patient was intoxicated and tripped on asphalt while throwing chairs at a [employer] function. He fell backwards and hit his head on the asphalt. Has a 3 x 2-1/2 cm abrasion on his posterior scalp. No other obvious deformities. Patient states he smokes a pack of cigarettes a day drinks alcohol somewhat regularly, but not daily. Patient's arriving ethanol was 197. Denies any anticoagulation use. AIRWAY:   Airway Normal  EMS ETT Absent  Noisy respirations Absent  Retractions: Absent  Vomiting/bleeding: Absent      BREATHING:    Midaxillary breath sound left:  Normal  Midaxillary breath sound right:  Normal    Cough sound intensity:  good   FiO2: Room air  SMI 2500 mL. CIRCULATION:   Femerol pulse intensity: Strong  Palpebral conjunctiva: Pink     Vitals:    10/09/22 0120   BP: (!) 101/52   Pulse: 92   Resp: 16   Temp: 98.2 °F (36.8 °C)   SpO2: 99%          FAST EXAM:  Not performed    Central Nervous System    GCS Initial 15 minutes   Eye  Motor  Verbal 4 - Opens eyes on own  6 - Follows simple motor commands  5 - Alert and oriented 4 - Opens eyes on own  6 - Follows simple motor commands  5 - Alert and oriented     Neuromuscular blockade: No  Pupil size:  Left 4 mm    Right 3 mm  Pupil reaction: The sluggish on the left    Wiggles fingers: Left Yes Right Yes  Wiggles toes: Left Yes   Right Yes    Hand grasp:   Left  Present      Right  Present  Plantar flexion: Left  Present      Right   Present    Loss of consciousness: Unsure  History Obtained From:  Patient & family member  Private Medical Doctor: No primary care provider on file. Pre-exisiting Medical History:  no  None    Conditions: History reviewed. No pertinent past medical history.       Medications:   Prior to Admission medications    Not on File Allergies: No Known Allergies      Social History:   Patient will binge drink occasionally, smokes a pack a day      Past Surgical History:    History reviewed. No pertinent surgical history. Anticoagulant use: No  Antiplatelet use: No  NSAID use in last 72 hours: no  Taken PCN in past:  unknown  Last food/drink: Lunch  Last tetanus: Unsure    Family History:   Not pertinent to presenting problem. No prior adverse reactions to anesthesia    Complaints:   Head: Posterior scalp pain, headache  Neck:   None  Chest:   None  Back:   None  Abdomen:   None  Extremities:   None      Review of systems:  All negative unless otherwise noted. SECONDARY SURVEY  Head/scalp: 3 cm x 2.5 cm abrasion on posterior scalp    Face: Atraumatic    Eyes/ears/nose: Atraumatic    Pharynx/mouth: Atraumatic    Neck: Atraumatic     Cervical spine tenderness:   Cervical collar not indicated  Pain:  none  ROM: Range of motion    Chest wall:  Atraumatic    Heart:  Regular rate & rhythm    Abdomen: Atraumatic. Soft ND  Tenderness:  none    Pelvis: Atraumatic  Tenderness: none    Thoracolumbar spine: Atraumatic  Tenderness:  none    Genitourinary:  Atraumatic. No blood or urine noted    Rectum: Deferred    Perineum: Deferred    Extremities:   Sensory normal  Motor normal    Distal Pulses  Left arm normal  Right arm normal  Left leg normal  Right leg normal    Capillary refill  Left arm normal  Right arm normal  Left leg normal  Right leg normal    Procedures in ED: IV access    In the event of Emergency Blood Transfusion:  Due to the critical condition of this patient, I request the immediate release of blood products for emergency transfusion secondary to shock. I understand the increased risks incurred by the lack of complete transfusion testing.       Radiology: CT head with SDH and occipital condyle fracture, negative C-spine, negative chest x-ray,     Consultations: Neurosurgery    Admission/Diagnosis: Fall, subdural hemorrhage    Plan of Treatment:  Follow-up CTA neck and repeat CT head  Admit to telemetry  [facility]   Follow up labs  [facility] exam      Plan discussed with Dr. Lesleigh Cushing     Electronically signed by Shweta Chand DO on 10/9/2022 at 2:59 AM

## 2022-10-09 NOTE — PROGRESS NOTES
History and physical reviewed, patient examined, counseled greater than 50% of encounter time films were reviewed    He is status post significant traumatic brain injury with a left frontal subdural hematoma and frontal lobe hemorrhagic contusion and occipital skull fracture    Patient is adamant that he is going to leave [address],     Patient is not making rational decision and leaving could cause harm to himself or others    Full note will be dictated

## 2022-10-09 NOTE — PROCEDURES
Attempted to do pelvis x-ray. Patient stated he wanted to wait until he woke up. We will attempt to do x-ray later in the day.

## 2022-10-09 NOTE — PLAN OF CARE
Problem: Discharge Planning  Goal: Discharge to home or other facility with appropriate resources  Outcome: Progressing  Flowsheets  Taken 10/9/2022 0900 by Amari Wilkes RN  Discharge to home or other facility with appropriate resources:   Identify barriers to discharge with patient and caregiver   Identify discharge learning needs (meds, wound care, etc)   Arrange for needed discharge resources and transportation as appropriate   Arrange for interpreters to assist at discharge as needed   Refer to discharge planning if patient needs post-hospital services based on physician order or complex needs related to functional status, cognitive ability or social support system  Taken 10/9/2022 0434 by Kevin Singh RN  Discharge to home or other facility with appropriate resources: Identify barriers to discharge with patient and caregiver     Problem: Safety - Adult  Goal: Free from fall injury  Outcome: Progressing  Flowsheets (Taken 10/9/2022 0900)  Free From Fall Injury:   Instruct family/caregiver on patient safety   Based on caregiver fall risk screen, instruct family/caregiver to ask for assistance with transferring infant if caregiver noted to have fall risk factors

## 2022-10-09 NOTE — PROGRESS NOTES
Attending Attestation   Patient seen and examined, agree with resident note except for changes made by me, for remaining HP/Consult/progress note details please see resident HP/Consult/progress note. TRAUMA SURGERY  ATTENDING PROGRESS NOTE      CC: fall    S:   Fall amnestic to event, moderate head and neck pain     O:   @/78   Pulse 92   Temp 99 °F (37.2 °C) (Oral)   Resp 16   Ht 6' (1.829 m)   Wt 166 lb 1.6 oz (75.3 kg)   SpO2 96%   BMI 22.53 kg/m² @    Gen - no apparent distress   Neuro - Awake, alert, attentive    HEENT - PERRL 3mm conj pink   Lungs - non labored, BS clear b/l    Heart - RR no extra heart sounds    Abdomen - soft non tender   Spine -   C spine tenderness upper midline, collar off, T/L non tender   Ext- rom wnl NVI    A/P: s/p fall with occipital skull fx and ICH      Principal Problem:    SDH (subdural hematoma)  Resolved Problems:    * No resolved hospital problems. *      ICH skull fx ns following keppra,   Neck pain monitor likely related to occipital skull fx,   Pain control SMI deep breathing   PTOT d/c planning     DVT prophylaxis: SCDs, Lovenox  See d/c summary     Ronna Rodriguez MD FACS                                                                                           TRAUMA HISTORY & PHYSICAL  Surgical Resident/Advance Practice Nurse  10/9/2022  2:59 AM     PRIMARY SURVEY     CHIEF COMPLAINT:  Trauma consult. Injury occurred at approximately 6 PM.  Patient was intoxicated and tripped on asphalt while throwing chairs at a [employer] function. He fell backwards and hit his head on the asphalt. Has a 3 x 2-1/2 cm abrasion on his posterior scalp. No other obvious deformities. Patient states he smokes a pack of cigarettes a day drinks alcohol somewhat regularly, but not daily. Patient's arriving ethanol was 197. Denies any anticoagulation use.      AIRWAY:   Airway Normal  EMS ETT Absent  Noisy respirations Absent  Retractions: Absent  Vomiting/bleeding: Absent BREATHING:    Midaxillary breath sound left:  Normal  Midaxillary breath sound right:  Normal     Cough sound intensity:  good   FiO2: Room air  SMI 2500 mL. CIRCULATION:   Femerol pulse intensity: Strong  Palpebral conjunctiva: Pink          Vitals:     10/09/22 0120   BP: (!) 101/52   Pulse: 92   Resp: 16   Temp: 98.2 °F (36.8 °C)   SpO2: 99%            FAST EXAM:  Not performed     Central Nervous System     GCS Initial 15 minutes   Eye  Motor  Verbal 4 - Opens eyes on own  6 - Follows simple motor commands  5 - Alert and oriented 4 - Opens eyes on own  6 - Follows simple motor commands  5 - Alert and oriented      Neuromuscular blockade: No  Pupil size:      Left 4 mm                          Right 3 mm  Pupil reaction: The sluggish on the left     Wiggles fingers: Left Yes Right Yes  Wiggles toes: Left Yes   Right Yes     Hand grasp:   Left  Present                            Right  Present  Plantar flexion:          Left  Present                                       Right   Present     Loss of consciousness: Unsure  History Obtained From:  Patient & family member  Private Medical Doctor: No primary care provider on file. Pre-exisiting Medical History:  no  None     Conditions:   Past Medical History   History reviewed. No pertinent past medical history. Medications:   Home Medications   Prior to Admission medications    Not on File               Allergies: No Known Allergies        Social History:   Patient will binge drink occasionally, smokes a pack a day        Past Surgical History:    Past Surgical History   History reviewed. No pertinent surgical history. Anticoagulant use: No  Antiplatelet use: No  NSAID use in last 72 hours: no  Taken PCN in past:  unknown  Last food/drink: Lunch  Last tetanus: Unsure     Family History:   Not pertinent to presenting problem.   No prior adverse reactions to anesthesia     Complaints:   Head: Posterior scalp pain, headache  Neck: None  Chest:   None  Back:   None  Abdomen:   None  Extremities:   None        Review of systems:  All negative unless otherwise noted. SECONDARY SURVEY  Head/scalp: 3 cm x 2.5 cm abrasion on posterior scalp     Face: Atraumatic     Eyes/ears/nose: Atraumatic     Pharynx/mouth: Atraumatic     Neck: Atraumatic      Cervical spine tenderness:   Cervical collar not indicated  Pain:  none  ROM: Range of motion     Chest wall:  Atraumatic     Heart:  Regular rate & rhythm     Abdomen: Atraumatic. Soft ND  Tenderness:  none     Pelvis: Atraumatic  Tenderness: none     Thoracolumbar spine: Atraumatic  Tenderness:  none     Genitourinary:  Atraumatic. No blood or urine noted     Rectum: Deferred     Perineum: Deferred     Extremities:   Sensory normal  Motor normal     Distal Pulses  Left arm normal  Right arm normal  Left leg normal  Right leg normal     Capillary refill  Left arm normal  Right arm normal  Left leg normal  Right leg normal     Procedures in ED: IV access     In the event of Emergency Blood Transfusion:  Due to the critical condition of this patient, I request the immediate release of blood products for emergency transfusion secondary to shock. I understand the increased risks incurred by the lack of complete transfusion testing.        Radiology: CT head with SDH and occipital condyle fracture, negative C-spine, negative chest x-ray,      Consultations: Neurosurgery     Admission/Diagnosis: Fall, subdural hemorrhage     Plan of Treatment:  Follow-up CTA neck and repeat CT head  Admit to telemetry  [facility]   Follow up labs  [facility] exam

## 2022-10-09 NOTE — PROGRESS NOTES
Occupational Therapy  Attempted OT eval 2x this date. AM, eval held for pending imaging of Pelvis. PM, pt sleeping soundly - unable to awaken pt to participate in eval ax. Will attempt session next date.   Thank you for this referral. Song Gonzalez, MOT, OTR/L  # [phone]

## 2022-10-09 NOTE — ED NOTES
Patient stated he is not ready to give a urine sample yet. Will notify this RN when ready.       Heidy Mchugh RN  10/08/22 1242

## 2022-10-09 NOTE — CONSULTS
Neurosurgery Consult Note      CHIEF COMPLAINT: head trauma    HPI:Trauma consult. Injury occurred at approximately 6 PM.  Patient was intoxicated and tripped on asphalt while throwing chairs at a [employer] function. He fell backwards and hit his head on the asphalt. Has a 3 x 2-1/2 cm abrasion on his posterior scalp. No other obvious deformities. Patient states he smokes a pack of cigarettes a day drinks alcohol somewhat regularly, but not daily. Patient's arriving ethanol was 197. Denies any anticoagulation use. Patient says he is going to pull out his IVs and leave [address], he would be a danger to himself or potentially to others so we pink slipped him      History reviewed. No pertinent past medical history. History reviewed. No pertinent surgical history. Patient has no known allergies. Prior to Admission medications    Not on File     No outpatient medications have been marked as taking for the 10/8/22 encounter [facility] Encounter).      Social History     Socioeconomic History    Marital status: Single     Spouse name: Not on file    Number of children: Not on file    Years of education: Not on file    Highest education level: Not on file   Occupational History    Not on file   Tobacco Use    Smoking status: Every Day     Packs/day: 1.00     Types: Cigarettes    Smokeless tobacco: Never   Substance and Sexual Activity    Alcohol use: Yes     Comment: patient states he drinks a couple drinks every night    Drug use: Yes     Frequency: 2.0 times per week     Types: Marijuana Devin Spina)    Sexual activity: Not on file   Other Topics Concern    Not on file   Social History Narrative    Not on file     Social Determinants of Health     Financial Resource Strain: Not on file   Food Insecurity: Not on file   Transportation Needs: Not on file   Physical Activity: Not on file   Stress: Not on file   Social Connections: Not on file   Intimate Partner Violence: Not on file   Housing Stability: Not on file     History reviewed. No pertinent family history. ROS: Complete 10 point ROS was obtained with positives in HPI, otherwise:  Pt denies fevers, denies chills. Pt denies chest pain, denies SOB, denies nausea, denies vomiting, complains of headache. VITALS/DRAINS:   VITALS:  /78   Pulse 92   Temp 99 °F (37.2 °C) (Oral)   Resp 16   Ht 6' (1.829 m)   Wt 166 lb 1.6 oz (75.3 kg)   SpO2 96%   BMI 22.53 kg/m²   24HR INTAKE/OUTPUT:    Intake/Output Summary (Last 24 hours) at 10/9/2022 1829  Last data filed at 10/9/2022 0555  Gross per 24 hour   Intake 100 ml   Output 950 ml   Net -850 ml       EXAMINATION:  Cranial Nerves: Motor:  Sensory:  Cerebellar:  Gait:  DTRs:    IMAGING STUDIES:  XR PELVIS (1-2 VIEWS)    Result Date: 10/9/2022  EXAMINATION: ONE XRAY VIEW OF THE PELVIS 10/9/2022 10:40 am COMPARISON: None. HISTORY: ORDERING SYSTEM PROVIDED HISTORY: fall, skull fracture, trauma TECHNOLOGIST PROVIDED HISTORY: Reason for exam:->fall, skull fracture, trauma What reading provider will be dictating this exam?->CRC FINDINGS: No evidence for acute fracture or dislocation. Bony architecture within normal limits. However tiny sclerotic lesion left femoral neck is noted felt represent bone island. There is contrast within distended urinary bladder. Contrast distention of the urinary bladder otherwise negative evaluation of the pelvis. CT HEAD WO CONTRAST    Result Date: 10/9/2022  EXAMINATION: CT OF THE HEAD WITHOUT CONTRAST  10/9/2022 3:37 am TECHNIQUE: CT of the head was performed without the administration of intravenous contrast. Automated exposure control, iterative reconstruction, and/or weight based adjustment of the mA/kV was utilized to reduce the radiation dose to as low as reasonably achievable. COMPARISON: None. HISTORY: ORDERING SYSTEM PROVIDED HISTORY: ICH stable? TECHNOLOGIST PROVIDED HISTORY: Reason for exam:->ICH stable?  Has a \"code stroke\" or \"stroke alert\" been called? ->No Decision Support Exception - unselect if not a suspected or confirmed emergency medical condition->Emergency Medical Condition ([place]) What reading provider will be dictating this exam?->CRC FINDINGS: BRAIN/VENTRICLES: There is no significant interval change in the trace interhemispheric subdural hematoma identified. There is no significant interval change in the trace left frontal convexity and left  anterior cranial fossa subdural hematoma measuring up to 2 mm in thickness. No significant interval change in the small amount of subarachnoid blood products identified along the left frontal and left temporal convexity. No significant interval change in small parenchymal contusions identified in left frontal lobe and left temporal lobe. There is a nondisplaced occipital bone fracture. ORBITS: The visualized portion of the orbits demonstrate no acute abnormality. SINUSES: The visualized paranasal sinuses and mastoid air cells demonstrate no acute abnormality. SOFT TISSUES/SKULL:  No acute abnormality of the visualized skull or soft tissues. Unchanged 11 septal bone fracture. No significant change in trace subdural hematoma along the left frontal convexity and interhemispheric fissure. No significant interval change in trace subarachnoid blood products identified. No significant interval change in small parenchymal contusions in left frontal and left temporal lobes.      CT Head WO Contrast    Result Date: 10/8/2022  EXAMINATION: CT OF THE HEAD WITHOUT CONTRAST; CT OF THE CERVICAL SPINE WITHOUT CONTRAST 10/8/2022 9:44 pm TECHNIQUE: CT of the head was performed without the administration of intravenous contrast. Automated exposure control, iterative reconstruction, and/or weight based adjustment of the mA/kV was utilized to reduce the radiation dose to as low as reasonably achievable.; CT of the cervical spine was performed without the administration of intravenous contrast. Multiplanar reformatted images are provided for review. Automated exposure control, iterative reconstruction, and/or weight based adjustment of the mA/kV was utilized to reduce the radiation dose to as low as reasonably achievable. COMPARISON: None. HISTORY: ORDERING SYSTEM PROVIDED HISTORY: fall; head injury TECHNOLOGIST PROVIDED HISTORY: Reason for exam:->fall; head injury Has a \"code stroke\" or \"stroke alert\" been called? ->No Decision Support Exception - unselect if not a suspected or confirmed emergency medical condition->Emergency Medical Condition ([place]) What reading provider will be dictating this exam?->CRC FINDINGS: CT HEAD: BRAIN/VENTRICLES: There is a small subdural hemorrhage and subarachnoid hemorrhage in the anterior left frontal lobe seen on series 302 image 31. Subdural hemorrhage and subarachnoid hemorrhage also identified in the medial aspect of the anterior right frontal lobe. Hemorrhage also identified in the anterior and inferior left temporal lobe on series 302, image 15. No additional evidence of intraparenchymal hemorrhage. Posterior fossa appears unremarkable. There is no evidence of midline shift or herniation. The ventricles and CSF containing spaces are otherwise unremarkable. There is a small foci of air in the cavernous sinus on the right which is presumably present secondary to the placement of an IV catheter ORBITS: The visualized portion of the orbits demonstrate no acute abnormality. SINUSES: There appears to be some sphenoid sinus disease right greater than left. SOFT TISSUES/SKULL:  Posterior scalp contusion seen on series 302, image 36. skull fracture of the occiput which extends through the occipital condyle and into the left aspect of the foramen magnum seen on series 304, image 6. CT CERVICAL SPINE: BONES/ALIGNMENT: Partially imaged occipital skull fracture which extends into the left aspect of the foramen magnum. C1 arch appears intact. The remaining cervical spinal levels appear intact.   Spinous processes are intact. The occipital atlantal and atlantoaxial articulation appears maintained. Normal appearance of the odontoid process. The facet joints appear appropriately aligned. Vertebral body height maintained. Straightening of the normal cervical lordosis. The remaining lateral alignment is preserved. DEGENERATIVE CHANGES: No significant degenerative changes. The central canal and neural foramen appear patent. SOFT TISSUES: Posterior paravertebral and prevertebral soft tissues appear unremarkable. Other: Airway is patent. No endobronchial lesions. Upper lungs are clear. No pneumothorax. 1.  Left occipital bone fracture extends through the occipital condyle and into the left aspect of the foramen magnum. Posterior scalp contusion. 2.  Left frontal lobe and to a lesser extent medial right frontal lobe subdural hematoma extending into the anterior falx. There are focal areas of subarachnoid hemorrhage involving the left frontal lobe, left temporal lobe, and medial right frontal lobe with parenchymal contusions in these regions. No evidence of midline shift or herniation at this time. 3.  No acute osseous findings in the cervical spine. Straightening of the normal cervical lordosis. Vertebral body heights maintained. 4.  No definite evidence of posterior fossa hemorrhage. No definite evidence of sinus thrombosis at this time. Critical results were called by Dr. Delana Landau to Dr. Krys Pete On 10/8/2022 at 23:17. RECOMMENDATIONS: Recommend close clinical follow-up and short interval repeat CT scan to assess for progression of hemorrhage.      CT Cervical Spine WO Contrast    Result Date: 10/8/2022  EXAMINATION: CT OF THE HEAD WITHOUT CONTRAST; CT OF THE CERVICAL SPINE WITHOUT CONTRAST 10/8/2022 9:44 pm TECHNIQUE: CT of the head was performed without the administration of intravenous contrast. Automated exposure control, iterative reconstruction, and/or weight based adjustment of the mA/kV was utilized to reduce the radiation dose to as low as reasonably achievable.; CT of the cervical spine was performed without the administration of intravenous contrast. Multiplanar reformatted images are provided for review. Automated exposure control, iterative reconstruction, and/or weight based adjustment of the mA/kV was utilized to reduce the radiation dose to as low as reasonably achievable. COMPARISON: None. HISTORY: ORDERING SYSTEM PROVIDED HISTORY: fall; head injury TECHNOLOGIST PROVIDED HISTORY: Reason for exam:->fall; head injury Has a \"code stroke\" or \"stroke alert\" been called? ->No Decision Support Exception - unselect if not a suspected or confirmed emergency medical condition->Emergency Medical Condition ([place]) What reading provider will be dictating this exam?->CRC FINDINGS: CT HEAD: BRAIN/VENTRICLES: There is a small subdural hemorrhage and subarachnoid hemorrhage in the anterior left frontal lobe seen on series 302 image 31. Subdural hemorrhage and subarachnoid hemorrhage also identified in the medial aspect of the anterior right frontal lobe. Hemorrhage also identified in the anterior and inferior left temporal lobe on series 302, image 15. No additional evidence of intraparenchymal hemorrhage. Posterior fossa appears unremarkable. There is no evidence of midline shift or herniation. The ventricles and CSF containing spaces are otherwise unremarkable. There is a small foci of air in the cavernous sinus on the right which is presumably present secondary to the placement of an IV catheter ORBITS: The visualized portion of the orbits demonstrate no acute abnormality. SINUSES: There appears to be some sphenoid sinus disease right greater than left. SOFT TISSUES/SKULL:  Posterior scalp contusion seen on series 302, image 36. skull fracture of the occiput which extends through the occipital condyle and into the left aspect of the foramen magnum seen on series 304, image 6.  CT CERVICAL SPINE: BONES/ALIGNMENT: Partially imaged occipital skull fracture which extends into the left aspect of the foramen magnum. C1 arch appears intact. The remaining cervical spinal levels appear intact. Spinous processes are intact. The occipital atlantal and atlantoaxial articulation appears maintained. Normal appearance of the odontoid process. The facet joints appear appropriately aligned. Vertebral body height maintained. Straightening of the normal cervical lordosis. The remaining lateral alignment is preserved. DEGENERATIVE CHANGES: No significant degenerative changes. The central canal and neural foramen appear patent. SOFT TISSUES: Posterior paravertebral and prevertebral soft tissues appear unremarkable. Other: Airway is patent. No endobronchial lesions. Upper lungs are clear. No pneumothorax. 1.  Left occipital bone fracture extends through the occipital condyle and into the left aspect of the foramen magnum. Posterior scalp contusion. 2.  Left frontal lobe and to a lesser extent medial right frontal lobe subdural hematoma extending into the anterior falx. There are focal areas of subarachnoid hemorrhage involving the left frontal lobe, left temporal lobe, and medial right frontal lobe with parenchymal contusions in these regions. No evidence of midline shift or herniation at this time. 3.  No acute osseous findings in the cervical spine. Straightening of the normal cervical lordosis. Vertebral body heights maintained. 4.  No definite evidence of posterior fossa hemorrhage. No definite evidence of sinus thrombosis at this time. Critical results were called by Dr. Rickey Cheng to Dr. Kira Hinojosa On 10/8/2022 at 23:17. RECOMMENDATIONS: Recommend close clinical follow-up and short interval repeat CT scan to assess for progression of hemorrhage. XR CHEST PORTABLE    Result Date: 10/8/2022  EXAMINATION: ONE XRAY VIEW OF THE CHEST 10/8/2022 8:00 pm COMPARISON: None.  HISTORY: ORDERING SYSTEM PROVIDED HISTORY: Shortness of breath TECHNOLOGIST PROVIDED HISTORY: Reason for exam:->Shortness of breath What reading provider will be dictating this exam?->CRC FINDINGS: The lungs are without acute focal process. There is no effusion or pneumothorax. The cardiomediastinal silhouette is without acute process. The osseous structures are without acute process. No acute process. CTA NECK W CONTRAST    Result Date: 10/9/2022  EXAMINATION: CTA OF THE NECK 10/9/2022 3:37 am TECHNIQUE: CTA of the neck was performed with the administration of intravenous contrast. Multiplanar reformatted images are provided for review. MIP images are provided for review. Stenosis of the internal carotid arteries measured using NASCET criteria. Automated exposure control, iterative reconstruction, and/or weight based adjustment of the mA/kV was utilized to reduce the radiation dose to as low as reasonably achievable. COMPARISON: CT cervical spine, CT head 10/08/2022 HISTORY: ORDERING SYSTEM PROVIDED HISTORY: occipital condyle fx, risk for vascular injury TECHNOLOGIST PROVIDED HISTORY: Reason for exam:->occipital condyle fx, risk for vascular injury Has a \"code stroke\" or \"stroke alert\" been called? ->No Decision Support Exception - unselect if not a suspected or confirmed emergency medical condition->Emergency Medical Condition ([place]) What reading provider will be dictating this exam?->CRC FINDINGS: AORTIC ARCH/ARCH VESSELS: No dissection or arterial injury. No significant stenosis of the brachiocephalic or subclavian arteries. CAROTID ARTERIES: No dissection, arterial injury, or hemodynamically significant stenosis by NASCET criteria. VERTEBRAL ARTERIES: No dissection, arterial injury, or significant stenosis. SOFT TISSUES: The lung apices are clear. No cervical or superior mediastinal lymphadenopathy. The larynx and pharynx are unremarkable. No acute abnormality of the salivary and thyroid glands.  BONES: Partially imaged nondisplaced left occipital bone fracture. No dissection, arterial injury, or hemodynamically significant stenosis of the major arteries of the neck. LABS:  CBC:   Lab Results   Component Value Date/Time    WBC 13.3 10/09/2022 05:04 AM    RBC 4.37 10/09/2022 05:04 AM    HGB 14.0 10/09/2022 05:04 AM    HCT 42.1 10/09/2022 05:04 AM    MCV 96.3 10/09/2022 05:04 AM    MCH 32.0 10/09/2022 05:04 AM    MCHC 33.3 10/09/2022 05:04 AM    RDW 13.0 10/09/2022 05:04 AM     10/09/2022 05:04 AM    MPV 10.1 10/09/2022 05:04 AM     BMP:    Lab Results   Component Value Date/Time     10/09/2022 05:04 AM    K 4.1 10/09/2022 05:04 AM    CL 97 10/09/2022 05:04 AM    CO2 21 10/09/2022 05:04 AM    BUN 5 10/09/2022 05:04 AM    LABALBU 4.3 10/08/2022 07:58 PM    CREATININE 0.8 10/09/2022 05:04 AM    CALCIUM 8.2 10/09/2022 05:04 AM    GFRAA >60 10/09/2022 05:04 AM    LABGLOM >60 10/09/2022 05:04 AM    GLUCOSE 92 10/09/2022 05:04 AM       IMPRESSION: Left frontal hemorrhagic contusion with subdural hematoma occipital skull fracture    RECOMMENDATIONS:Continue Kera follow routine head injury protocols    Thank you again for this consultation.       Rigo Dixon MD  10/9/2022

## 2022-10-10 LAB
ANION GAP SERPL CALCULATED.3IONS-SCNC: 12 MMOL/L (ref 7–16)
BASOPHILS ABSOLUTE: 0.1 E9/L (ref 0–0.2)
BASOPHILS RELATIVE PERCENT: 0.9 % (ref 0–2)
BUN BLDV-MCNC: 6 MG/DL (ref 6–20)
CALCIUM SERPL-MCNC: 8.9 MG/DL (ref 8.6–10.2)
CHLORIDE BLD-SCNC: 103 MMOL/L (ref 98–107)
CO2: 25 MMOL/L (ref 22–29)
CREAT SERPL-MCNC: 0.8 MG/DL (ref 0.7–1.2)
EOSINOPHILS ABSOLUTE: 0 E9/L (ref 0.05–0.5)
EOSINOPHILS RELATIVE PERCENT: 0.4 % (ref 0–6)
GFR AFRICAN AMERICAN: >60
GFR NON-AFRICAN AMERICAN: >60 ML/MIN/1.73
GLUCOSE BLD-MCNC: 103 MG/DL (ref 74–99)
HCT VFR BLD CALC: 39.5 % (ref 37–54)
HEMOGLOBIN: 13.6 G/DL (ref 12.5–16.5)
LYMPHOCYTES ABSOLUTE: 1.12 E9/L (ref 1.5–4)
LYMPHOCYTES RELATIVE PERCENT: 10.4 % (ref 20–42)
MCH RBC QN AUTO: 31.9 PG (ref 26–35)
MCHC RBC AUTO-ENTMCNC: 34.4 % (ref 32–34.5)
MCV RBC AUTO: 92.5 FL (ref 80–99.9)
MONOCYTES ABSOLUTE: 1.23 E9/L (ref 0.1–0.95)
MONOCYTES RELATIVE PERCENT: 11.3 % (ref 2–12)
NEUTROPHILS ABSOLUTE: 8.62 E9/L (ref 1.8–7.3)
NEUTROPHILS RELATIVE PERCENT: 77.4 % (ref 43–80)
PDW BLD-RTO: 12.8 FL (ref 11.5–15)
PLATELET # BLD: 209 E9/L (ref 130–450)
PMV BLD AUTO: 10 FL (ref 7–12)
POTASSIUM REFLEX MAGNESIUM: 3.8 MMOL/L (ref 3.5–5)
RBC # BLD: 4.27 E12/L (ref 3.8–5.8)
RBC # BLD: NORMAL 10*6/UL
SODIUM BLD-SCNC: 140 MMOL/L (ref 132–146)
WBC # BLD: 11.2 E9/L (ref 4.5–11.5)

## 2022-10-10 PROCEDURE — 2060000000 HC ICU INTERMEDIATE R&B

## 2022-10-10 PROCEDURE — 6370000000 HC RX 637 (ALT 250 FOR IP)

## 2022-10-10 PROCEDURE — 85025 COMPLETE CBC W/AUTO DIFF WBC: CPT

## 2022-10-10 PROCEDURE — 99232 SBSQ HOSP IP/OBS MODERATE 35: CPT | Performed by: SURGERY

## 2022-10-10 PROCEDURE — 80048 BASIC METABOLIC PNL TOTAL CA: CPT

## 2022-10-10 PROCEDURE — 36415 COLL VENOUS BLD VENIPUNCTURE: CPT

## 2022-10-10 PROCEDURE — 2580000003 HC RX 258

## 2022-10-10 PROCEDURE — 6370000000 HC RX 637 (ALT 250 FOR IP): Performed by: STUDENT IN AN ORGANIZED HEALTH CARE EDUCATION/TRAINING PROGRAM

## 2022-10-10 PROCEDURE — 92523 SPEECH SOUND LANG COMPREHEN: CPT

## 2022-10-10 RX ORDER — ENOXAPARIN SODIUM 100 MG/ML
30 INJECTION SUBCUTANEOUS 2 TIMES DAILY
Status: DISCONTINUED | OUTPATIENT
Start: 2022-10-10 | End: 2022-10-11 | Stop reason: HOSPADM

## 2022-10-10 RX ORDER — NICOTINE 21 MG/24HR
1 PATCH, TRANSDERMAL 24 HOURS TRANSDERMAL DAILY
Status: DISCONTINUED | OUTPATIENT
Start: 2022-10-10 | End: 2022-10-11 | Stop reason: HOSPADM

## 2022-10-10 RX ADMIN — LEVETIRACETAM 500 MG: 500 TABLET, FILM COATED ORAL at 09:37

## 2022-10-10 RX ADMIN — BACITRACIN ZINC: 500 OINTMENT TOPICAL at 09:38

## 2022-10-10 RX ADMIN — POTASSIUM BICARBONATE 20 MEQ: 782 TABLET, EFFERVESCENT ORAL at 12:47

## 2022-10-10 RX ADMIN — BACITRACIN ZINC: 500 OINTMENT TOPICAL at 20:28

## 2022-10-10 RX ADMIN — ACETAMINOPHEN 1000 MG: 500 TABLET ORAL at 15:53

## 2022-10-10 RX ADMIN — SENNOSIDES AND DOCUSATE SODIUM 1 TABLET: 50; 8.6 TABLET ORAL at 20:27

## 2022-10-10 RX ADMIN — OXYCODONE HYDROCHLORIDE 10 MG: 10 TABLET ORAL at 07:31

## 2022-10-10 RX ADMIN — OXYCODONE HYDROCHLORIDE 10 MG: 10 TABLET ORAL at 03:23

## 2022-10-10 RX ADMIN — SENNOSIDES AND DOCUSATE SODIUM 1 TABLET: 50; 8.6 TABLET ORAL at 09:37

## 2022-10-10 RX ADMIN — ACETAMINOPHEN 1000 MG: 500 TABLET ORAL at 07:31

## 2022-10-10 RX ADMIN — OXYCODONE 5 MG: 5 TABLET ORAL at 13:11

## 2022-10-10 RX ADMIN — SODIUM CHLORIDE, PRESERVATIVE FREE 10 ML: 5 INJECTION INTRAVENOUS at 09:37

## 2022-10-10 RX ADMIN — OXYCODONE HYDROCHLORIDE 10 MG: 10 TABLET ORAL at 17:21

## 2022-10-10 RX ADMIN — LEVETIRACETAM 500 MG: 500 TABLET, FILM COATED ORAL at 20:27

## 2022-10-10 RX ADMIN — BACITRACIN ZINC: 500 OINTMENT TOPICAL at 12:47

## 2022-10-10 RX ADMIN — OXYCODONE HYDROCHLORIDE 10 MG: 10 TABLET ORAL at 21:37

## 2022-10-10 ASSESSMENT — PAIN SCALES - WONG BAKER
WONGBAKER_NUMERICALRESPONSE: 0

## 2022-10-10 ASSESSMENT — PAIN DESCRIPTION - PAIN TYPE
TYPE: ACUTE PAIN

## 2022-10-10 ASSESSMENT — PAIN DESCRIPTION - LOCATION
LOCATION: HEAD
LOCATION: HEAD
LOCATION: HEAD;EYE
LOCATION: HEAD
LOCATION: HEAD

## 2022-10-10 ASSESSMENT — PAIN SCALES - GENERAL
PAINLEVEL_OUTOF10: 10
PAINLEVEL_OUTOF10: 8
PAINLEVEL_OUTOF10: 8
PAINLEVEL_OUTOF10: 7
PAINLEVEL_OUTOF10: 10
PAINLEVEL_OUTOF10: 10
PAINLEVEL_OUTOF10: 7

## 2022-10-10 ASSESSMENT — PAIN DESCRIPTION - FREQUENCY
FREQUENCY: INTERMITTENT
FREQUENCY: CONTINUOUS
FREQUENCY: CONTINUOUS

## 2022-10-10 ASSESSMENT — PAIN DESCRIPTION - ORIENTATION
ORIENTATION: POSTERIOR
ORIENTATION: RIGHT;LEFT;ANTERIOR
ORIENTATION: LEFT;RIGHT;ANTERIOR
ORIENTATION: RIGHT;LEFT;ANTERIOR

## 2022-10-10 ASSESSMENT — PAIN DESCRIPTION - ONSET
ONSET: ON-GOING

## 2022-10-10 ASSESSMENT — PAIN - FUNCTIONAL ASSESSMENT
PAIN_FUNCTIONAL_ASSESSMENT: PREVENTS OR INTERFERES SOME ACTIVE ACTIVITIES AND ADLS
PAIN_FUNCTIONAL_ASSESSMENT: PREVENTS OR INTERFERES SOME ACTIVE ACTIVITIES AND ADLS
PAIN_FUNCTIONAL_ASSESSMENT: ACTIVITIES ARE NOT PREVENTED
PAIN_FUNCTIONAL_ASSESSMENT: PREVENTS OR INTERFERES SOME ACTIVE ACTIVITIES AND ADLS

## 2022-10-10 ASSESSMENT — PAIN DESCRIPTION - DESCRIPTORS
DESCRIPTORS: ACHING;THROBBING;PRESSURE
DESCRIPTORS: ACHING;DISCOMFORT;NAGGING
DESCRIPTORS: ACHING;DISCOMFORT;CRUSHING
DESCRIPTORS: ACHING
DESCRIPTORS: GNAWING

## 2022-10-10 ASSESSMENT — LIFESTYLE VARIABLES
HOW MANY STANDARD DRINKS CONTAINING ALCOHOL DO YOU HAVE ON A TYPICAL DAY: PATIENT DOES NOT DRINK
HOW OFTEN DO YOU HAVE A DRINK CONTAINING ALCOHOL: 2-4 TIMES A MONTH

## 2022-10-10 NOTE — PLAN OF CARE
Problem: Discharge Planning  Goal: Discharge to home or other facility with appropriate resources  Outcome: Progressing  Flowsheets (Taken 10/10/2022 0900)  Discharge to home or other facility with appropriate resources: Identify barriers to discharge with patient and caregiver     Problem: Safety - Adult  Goal: Free from fall injury  Outcome: Progressing  Flowsheets (Taken 10/10/2022 0826)  Free From Fall Injury: Instruct family/caregiver on patient safety     Problem: Pain  Goal: Verbalizes/displays adequate comfort level or baseline comfort level  Outcome: Progressing

## 2022-10-10 NOTE — PROGRESS NOTES
[facility]  ATTENDING PHYSICIAN PROGRESS NOTE      I personally saw, examined and provided care for the patient. Radiographs, labs and medications were reviewed by me independently. The case was discussed in detail and plans for care were established. Review of Residents documentation was conducted and revisions were made as appropriate. I agree with the above documented exam, problem list and plan of care. The following summarizes my clinical findings and independent assessment. CC: Subdural hemorrhage, basilar skull fracture after fall    S. Patient is sitting in a chair. He was pink slipped by neurosurgery yesterday try to leave. O.  No apparent distress, flat affect  Pupils equal round reactive to light,  Breathing comfortably lungs clear bilaterally  Heart regular rate rhythm, S1-S2  Abdomen soft nontender nondistended    ASSESSMENT:  Principal Problem:    SDH (subdural hematoma)  Resolved Problems:    * No resolved hospital problems. *       PLAN:  Subdural hematoma-stable. Continue Keppra for early TBI seizure prophylaxis  Basilar skull fracture-nonoperative management CTA neck was negative for blunt cerebrovascular injury    ALK intoxication-needs SBI by social work    [employer]    PT OT    DVT Proph: SCDS/start Lovenox tonight    Refer to discharge summary as part of the discharge planning. Lewis Tay MD, FACS  10/10/2022  2:26 PM    NOTE: This report was transcribed using voice recognition software. Every effort was made to ensure accuracy; however, inadvertent computerized transcription errors may be present.

## 2022-10-10 NOTE — PROGRESS NOTES
Neurosurg progress note  VITALS:  BP (!) 130/93   Pulse 89   Temp 100.3 °F (37.9 °C) (Oral)   Resp 20   Ht 6' (1.829 m)   Wt 161 lb 14.4 oz (73.4 kg)   SpO2 97%   BMI 21.96 kg/m²   24HR INTAKE/OUTPUT:  No intake or output data in the 24 hours ending 10/10/22 1146  XR PELVIS (1-2 VIEWS)    Result Date: 10/9/2022  EXAMINATION: ONE XRAY VIEW OF THE PELVIS 10/9/2022 10:40 am COMPARISON: None. HISTORY: ORDERING SYSTEM PROVIDED HISTORY: fall, skull fracture, trauma TECHNOLOGIST PROVIDED HISTORY: Reason for exam:->fall, skull fracture, trauma What reading provider will be dictating this exam?->CRC FINDINGS: No evidence for acute fracture or dislocation. Bony architecture within normal limits. However tiny sclerotic lesion left femoral neck is noted felt represent bone island. There is contrast within distended urinary bladder. Contrast distention of the urinary bladder otherwise negative evaluation of the pelvis. CT HEAD WO CONTRAST    Result Date: 10/9/2022  EXAMINATION: CT OF THE HEAD WITHOUT CONTRAST  10/9/2022 3:37 am TECHNIQUE: CT of the head was performed without the administration of intravenous contrast. Automated exposure control, iterative reconstruction, and/or weight based adjustment of the mA/kV was utilized to reduce the radiation dose to as low as reasonably achievable. COMPARISON: None. HISTORY: ORDERING SYSTEM PROVIDED HISTORY: ICH stable? TECHNOLOGIST PROVIDED HISTORY: Reason for exam:->ICH stable? Has a \"code stroke\" or \"stroke alert\" been called? ->No Decision Support Exception - unselect if not a suspected or confirmed emergency medical condition->Emergency Medical Condition (MA) What reading provider will be dictating this exam?->CRC FINDINGS: BRAIN/VENTRICLES: There is no significant interval change in the trace interhemispheric subdural hematoma identified.   There is no significant interval change in the trace left frontal convexity and left  anterior cranial fossa subdural hematoma measuring up to 2 mm in thickness. No significant interval change in the small amount of subarachnoid blood products identified along the left frontal and left temporal convexity. No significant interval change in small parenchymal contusions identified in left frontal lobe and left temporal lobe. There is a nondisplaced occipital bone fracture. ORBITS: The visualized portion of the orbits demonstrate no acute abnormality. SINUSES: The visualized paranasal sinuses and mastoid air cells demonstrate no acute abnormality. SOFT TISSUES/SKULL:  No acute abnormality of the visualized skull or soft tissues. Unchanged 11 septal bone fracture. No significant change in trace subdural hematoma along the left frontal convexity and interhemispheric fissure. No significant interval change in trace subarachnoid blood products identified. No significant interval change in small parenchymal contusions in left frontal and left temporal lobes. CT Head WO Contrast    Result Date: 10/8/2022  EXAMINATION: CT OF THE HEAD WITHOUT CONTRAST; CT OF THE CERVICAL SPINE WITHOUT CONTRAST 10/8/2022 9:44 pm TECHNIQUE: CT of the head was performed without the administration of intravenous contrast. Automated exposure control, iterative reconstruction, and/or weight based adjustment of the mA/kV was utilized to reduce the radiation dose to as low as reasonably achievable.; CT of the cervical spine was performed without the administration of intravenous contrast. Multiplanar reformatted images are provided for review. Automated exposure control, iterative reconstruction, and/or weight based adjustment of the mA/kV was utilized to reduce the radiation dose to as low as reasonably achievable. COMPARISON: None. HISTORY: ORDERING SYSTEM PROVIDED HISTORY: fall; head injury TECHNOLOGIST PROVIDED HISTORY: Reason for exam:->fall; head injury Has a \"code stroke\" or \"stroke alert\" been called? ->No Decision Support Exception - unselect if not a suspected or confirmed emergency medical condition->Emergency Medical Condition (MA) What reading provider will be dictating this exam?->CRC FINDINGS: CT HEAD: BRAIN/VENTRICLES: There is a small subdural hemorrhage and subarachnoid hemorrhage in the anterior left frontal lobe seen on series 302 image 31. Subdural hemorrhage and subarachnoid hemorrhage also identified in the medial aspect of the anterior right frontal lobe. Hemorrhage also identified in the anterior and inferior left temporal lobe on series 302, image 15. No additional evidence of intraparenchymal hemorrhage. Posterior fossa appears unremarkable. There is no evidence of midline shift or herniation. The ventricles and CSF containing spaces are otherwise unremarkable. There is a small foci of air in the cavernous sinus on the right which is presumably present secondary to the placement of an IV catheter ORBITS: The visualized portion of the orbits demonstrate no acute abnormality. SINUSES: There appears to be some sphenoid sinus disease right greater than left. SOFT TISSUES/SKULL:  Posterior scalp contusion seen on series 302, image 36. skull fracture of the occiput which extends through the occipital condyle and into the left aspect of the foramen magnum seen on series 304, image 6. CT CERVICAL SPINE: BONES/ALIGNMENT: Partially imaged occipital skull fracture which extends into the left aspect of the foramen magnum. C1 arch appears intact. The remaining cervical spinal levels appear intact. Spinous processes are intact. The occipital atlantal and atlantoaxial articulation appears maintained. Normal appearance of the odontoid process. The facet joints appear appropriately aligned. Vertebral body height maintained. Straightening of the normal cervical lordosis. The remaining lateral alignment is preserved. DEGENERATIVE CHANGES: No significant degenerative changes. The central canal and neural foramen appear patent.  SOFT TISSUES: Posterior fall; head injury TECHNOLOGIST PROVIDED HISTORY: Reason for exam:->fall; head injury Has a \"code stroke\" or \"stroke alert\" been called? ->No Decision Support Exception - unselect if not a suspected or confirmed emergency medical condition->Emergency Medical Condition (MA) What reading provider will be dictating this exam?->CRC FINDINGS: CT HEAD: BRAIN/VENTRICLES: There is a small subdural hemorrhage and subarachnoid hemorrhage in the anterior left frontal lobe seen on series 302 image 31. Subdural hemorrhage and subarachnoid hemorrhage also identified in the medial aspect of the anterior right frontal lobe. Hemorrhage also identified in the anterior and inferior left temporal lobe on series 302, image 15. No additional evidence of intraparenchymal hemorrhage. Posterior fossa appears unremarkable. There is no evidence of midline shift or herniation. The ventricles and CSF containing spaces are otherwise unremarkable. There is a small foci of air in the cavernous sinus on the right which is presumably present secondary to the placement of an IV catheter ORBITS: The visualized portion of the orbits demonstrate no acute abnormality. SINUSES: There appears to be some sphenoid sinus disease right greater than left. SOFT TISSUES/SKULL:  Posterior scalp contusion seen on series 302, image 36. skull fracture of the occiput which extends through the occipital condyle and into the left aspect of the foramen magnum seen on series 304, image 6. CT CERVICAL SPINE: BONES/ALIGNMENT: Partially imaged occipital skull fracture which extends into the left aspect of the foramen magnum. C1 arch appears intact. The remaining cervical spinal levels appear intact. Spinous processes are intact. The occipital atlantal and atlantoaxial articulation appears maintained. Normal appearance of the odontoid process. The facet joints appear appropriately aligned. Vertebral body height maintained.  Straightening of the normal cervical lordosis. The remaining lateral alignment is preserved. DEGENERATIVE CHANGES: No significant degenerative changes. The central canal and neural foramen appear patent. SOFT TISSUES: Posterior paravertebral and prevertebral soft tissues appear unremarkable. Other: Airway is patent. No endobronchial lesions. Upper lungs are clear. No pneumothorax. 1.  Left occipital bone fracture extends through the occipital condyle and into the left aspect of the foramen magnum. Posterior scalp contusion. 2.  Left frontal lobe and to a lesser extent medial right frontal lobe subdural hematoma extending into the anterior falx. There are focal areas of subarachnoid hemorrhage involving the left frontal lobe, left temporal lobe, and medial right frontal lobe with parenchymal contusions in these regions. No evidence of midline shift or herniation at this time. 3.  No acute osseous findings in the cervical spine. Straightening of the normal cervical lordosis. Vertebral body heights maintained. 4.  No definite evidence of posterior fossa hemorrhage. No definite evidence of sinus thrombosis at this time. Critical results were called by Dr. Misty De La Torre to Dr. Samuel Matthews On 10/8/2022 at 23:17. RECOMMENDATIONS: Recommend close clinical follow-up and short interval repeat CT scan to assess for progression of hemorrhage. XR CHEST PORTABLE    Result Date: 10/8/2022  EXAMINATION: ONE XRAY VIEW OF THE CHEST 10/8/2022 8:00 pm COMPARISON: None. HISTORY: ORDERING SYSTEM PROVIDED HISTORY: Shortness of breath TECHNOLOGIST PROVIDED HISTORY: Reason for exam:->Shortness of breath What reading provider will be dictating this exam?->CRC FINDINGS: The lungs are without acute focal process. There is no effusion or pneumothorax. The cardiomediastinal silhouette is without acute process. The osseous structures are without acute process. No acute process.      CTA NECK W CONTRAST    Result Date: 10/9/2022  EXAMINATION: CTA OF THE NECK 10/9/2022 3:37 am TECHNIQUE: CTA of the neck was performed with the administration of intravenous contrast. Multiplanar reformatted images are provided for review. MIP images are provided for review. Stenosis of the internal carotid arteries measured using NASCET criteria. Automated exposure control, iterative reconstruction, and/or weight based adjustment of the mA/kV was utilized to reduce the radiation dose to as low as reasonably achievable. COMPARISON: CT cervical spine, CT head 10/08/2022 HISTORY: ORDERING SYSTEM PROVIDED HISTORY: occipital condyle fx, risk for vascular injury TECHNOLOGIST PROVIDED HISTORY: Reason for exam:->occipital condyle fx, risk for vascular injury Has a \"code stroke\" or \"stroke alert\" been called? ->No Decision Support Exception - unselect if not a suspected or confirmed emergency medical condition->Emergency Medical Condition (MA) What reading provider will be dictating this exam?->CRC FINDINGS: AORTIC ARCH/ARCH VESSELS: No dissection or arterial injury. No significant stenosis of the brachiocephalic or subclavian arteries. CAROTID ARTERIES: No dissection, arterial injury, or hemodynamically significant stenosis by NASCET criteria. VERTEBRAL ARTERIES: No dissection, arterial injury, or significant stenosis. SOFT TISSUES: The lung apices are clear. No cervical or superior mediastinal lymphadenopathy. The larynx and pharynx are unremarkable. No acute abnormality of the salivary and thyroid glands. BONES: Partially imaged nondisplaced left occipital bone fracture. No dissection, arterial injury, or hemodynamically significant stenosis of the major arteries of the neck.      CBC:   Lab Results   Component Value Date/Time    WBC 11.2 10/10/2022 04:58 AM    RBC 4.27 10/10/2022 04:58 AM    HGB 13.6 10/10/2022 04:58 AM    HCT 39.5 10/10/2022 04:58 AM    MCV 92.5 10/10/2022 04:58 AM    MCH 31.9 10/10/2022 04:58 AM    MCHC 34.4 10/10/2022 04:58 AM    RDW 12.8 10/10/2022 04:58 AM     10/10/2022 04:58 AM    MPV 10.0 10/10/2022 04:58 AM     BMP:    Lab Results   Component Value Date/Time     10/10/2022 04:58 AM    K 3.8 10/10/2022 04:58 AM     10/10/2022 04:58 AM    CO2 25 10/10/2022 04:58 AM    BUN 6 10/10/2022 04:58 AM    LABALBU 4.3 10/08/2022 07:58 PM    CREATININE 0.8 10/10/2022 04:58 AM    CALCIUM 8.9 10/10/2022 04:58 AM    GFRAA >60 10/10/2022 04:58 AM    LABGLOM >60 10/10/2022 04:58 AM    GLUCOSE 103 10/10/2022 04:58 AM      nicotine  1 patch TransDERmal Daily    potassium bicarb-citric acid  20 mEq Oral Once    sodium chloride flush  5-40 mL IntraVENous 2 times per day    bacitracin zinc   Topical TID    sennosides-docusate sodium  1 tablet Oral BID    levETIRAcetam  500 mg Oral BID     Resting quietly opens eyes to voice follows commands denied neck pain collar is been removed moving all fours equally  Assessment:  Patient Active Problem List   Diagnosis    SDH (subdural hematoma)     Plan:  Will eventually need PT OT assessment continue current care  Mag Gonzalez MD M.D.

## 2022-10-10 NOTE — CARE COORDINATION
Care Coordination  The patient was admitted after being intoxicated at a [employer] function. He tripped on asphalt while throwing chairs at a [employer] function. He fell backwards and hit his head on the asphalt. Has a 3 x 2-1/2 cm abrasion on his posterior scalp. Patient states he smokes a pack of cigarettes a day drinks alcohol somewhat regularly, but not daily. His etoh score was 197 on arrival in ed. Per films ct of the head shows a subdural hematoma, and  an occipital condyle fx, subarachnoid hemorrhage. Labs wbc 13.3, k-3.2, drug screen + acetaminophen, + drug screen + fentanyl. The patient is on a c collar, general diet, neurosurgery on consult. Therapies consulted. The patient is started on po keppra 500 mg bid, Nicoderm 14mg/24 hrs, po oxy 5-10 mg po q4 prn. Met with the grandmother at bedside to discuss transition care planning. The patient lives with grandmother in a 2 story home 3 steps to enter. His bed and bath are on the second level with 14 steps to get to it. The patient was independent prior to admission. He does not have a primary care physician  and her pharmacy is [facility] in [facility]. His plan is to return home vs rehab. Will do Sbi.

## 2022-10-10 NOTE — DISCHARGE INSTRUCTIONS
TRAUMA SERVICES DISCHARGE INSTRUCTIONS    Call [phone], option 2, for any questions/concerns and for follow-up appointment in 2 week(s). Please follow the instructions checked below:    Please follow-up with your primary care provider. ACTIVITY INSTRUCTIONS  Increase activity as tolerated  No heavy lifting or strenuous activity  Take your incentive spirometer home and use 4-6 times/day   [x]  No driving until cleared by neurosurgery    WOUND/DRESSING INSTRUCTIONS:  You may shower. No sitting in bath tub, hot tub or swimming until cleared by physician. Ice to areas of pain for first 24 hours. Heat to areas of pain after that. Wash areas of lacerations/abrasions with soap & water. Rinse well. Pat dry with clean towel. Apply thin layer of Bacitracin, Neosporin, or triple antibiotic cream to affected area 2-3 times per day. Keep wounds clean and dry. []  Sutures/Staples are to be removed    MEDICATION INSTRUCTIONS  Take medication as prescribed. When taking pain medications, you may experience dizziness or drowsiness. Do not drink alcohol or drive when taking these medications. You may experience constipation while taking pain medication. You may take over the counter stool softeners such as docusate (Colace), sennosides S (Senokot-S), or Miralax.   []  You may take Ibuprofen (over the counter) as directed for mild pain. --You may take up to 800mg every 8 hours for pain, please take with food or milk. [x]  You may take acetaminophen (Tylenol) products. Do NOT take more than 4000mg of Tylenol in 24h. [x]  Do not take any other acetaminophen (Tylenol) products if you are taking Percocet or Norco, as these contain Tylenol. --Do NOT take more than 4000mg of Tylenol in 24h. OPIOID MEDICATION INSTRUCTIONS  Read the medication guide that is included with your prescription. Take your medication exactly as prescribed. Store medication away from children and in a safe place.   Do NOT share your medication with others. Do NOT take medication unless it is prescribed for you. Do NOT drink alcohol while taking opioids (I.e., Norco, Percocet, Oxycodone, etc). Discuss with the Trauma Clinic staff if the dose of medication you are taking does not control your pain and any side effects that you may be having. CALL 911 OR YOUR LOCAL EMERGENCY SERVICE:  --If you take too much medication  --If you have trouble breathing or shortness of breath  --A child has taken this medication. WORK:  You may not return to work until you receive follow-up with the Trauma Clinic or clearance by all consultants. Call the trauma clinic for any of the following or for questions/concerns;  --fever over 101F  --redness, swelling, hardness or warmth at the wound site(s). --Unrelieved nausea/vomiting  --Foul smelling or cloudy drainage at the wound site(s)  --Unrelieved pain or increase in pain  --Increase in shortness of breath    Follow-up:  Trauma Clinic: [phone] [place], [place]  [id number]      MILD TRAUMATIC BRAIN INJURY OR CONCUSSION  A mild traumatic brain injury (TBI) is one that causes some degree of injury to the brain causing symptoms ranging from a brief period of confusion to loss of consciousness (being knocked out). There is no major bruising or bleeding in the brain but symptoms can last from hours to months depending on the severity of the injury. Family or friends need to observe any change in behavior for the next 48 hours. Delayed effects from head injury do occur occasionally and can be due to slow bleeding or swelling around the surface of the brain. These effects may occur even if the x-rays/CT scans were normal.  Please observe the following symptoms during the next 24-48 hours. CALL 911 if:  Pupils (black part in the center of the eye) are unequal in size, and this is new. Seizure (convulsion).   Not responding to others/won't wake up or very hard to wake up  Faints (passes out)  Vomiting more than 3 times    Notify the TRAUMA CLINIC if any of the following symptoms occur:  Severe headache -- Mild headache may last for days. Report worsening pain or uncontrolled pain with prescribed medicine. Numbness, tingling or weakness -- Present in arms or legs; unsteady walking. Eye Changes/light sensation -- Vision problems; blurred or double-vision; unequal sized pupils. Nausea/Vomiting -- Episodes of vomiting may occur initially after a head injury. Persistent vomiting or difficulty taking medication by mouth. Increased Sleepiness -- Difficulty waking from sleep with increased confusion. Dizziness -- Does not go away or occurs repeatedly. Vomiting may accompany dizziness. Drainage -- Clear fluid or blood from the nose and ears. Fever -- Temperature over 101 degrees. Neck Pain. The First Four Weeks  The symptoms below are common after a mild brain injury. They usually get better on their own within a few weeks:   feeling tired or ?low  problems falling or staying asleep  feeling confused, poor concentration, or slow to answer questions  feeling dizzy, poor balance, or poor coordination  being sensitive to light  being sensitive to sounds  ringing in the ears  a mild headache, sometimes with nausea and/or vomiting  being irritable, having mood swings, or feeling somewhat sad or down  Contact the [address] [place] [address] Road if your symptoms are affecting your everyday activities. Remember that letting yourself get too tired can make your symptoms worse. Listen to your body: if doing a certain activity increases your symptoms, take a break from that activity. Build up the amount of time you do an activity and stay under the threshold of symptoms. Long term Effects (Post-Concussive Syndrome)    Notify physician if any of the following persists longer than 2-4 weeks.     Difficulty with concentration or attention (easily distracted)  Frequent headaches  Memory problems   Sensitivity to light   Sleeping difficulties      There is a higher risk of having a more serious head injury if:  Previous history of head injury or concussion  Taking medicine that thins your blood, or have a bleeding disorder  Have other neurologic (brain) problems  Have difficulty walking or frequent falls  Active in high impact contact sports, like soccer or football. Activities  Stay away from activities that could cause another head injury (like sports), until the doctor says it's okay. A second blow to the head can cause more damage to the brain  Limit reading, television, video games, etc. the first 48 hours. Your brain needs to rest so that it can recover. You may find that it helps to take time off school or work. Limit exposure to bright lights, loud noises, and crowds for the first 48 hours, as these can make your symptoms worse  Limit use of screens, such as an IPad, computer, cellphone, TV, etc, as these can make symptoms, especially headaches, worse. Work/School  It is recommended that you wait to return to work/school until after your follow-up appointment with trauma or your family doctor. If you are having no symptoms, please call for an earlier appointment  Some people find it hard to concentrate well so return to your normal activities slowly. Go back to work or school for half days at first, and increase as tolerated. Trauma services can help you with a graduated schedule. If you feel comfortable doing so, tell work or school about your concussion. You may have to adjust your activities, depending on your job or school demands. Rest and Sleep  Rest for the first 24 hours; it's one of the best things to help your brain recover. It's okay to sleep if you want  You don't have to be woken up every few hours. If someone does wake you up, you should awaken easily.     Do some light physical activity (housework) or light exercise (walking, stationary bike) as soon as you can tolerate the movement. Strenuous exercise (such as jogging or weight lifting) can make your concussion symptoms worse or last longer. Diet  Return to a normal diet as tolerated, you may want to start with liquids first  Eat healthy meals (including breakfast) and snacks throughout the day as your brain heals. Managing Pain  Tylenol or Ibuprofen are the best meds to take for headaches. Your doctor may have prescribed you medications if your headaches were severe or you have other injuries. Please take as directed. Driving  Your ability to concentrate and react quickly might be affected by the concussion. Please contact trauma services for advice on when to resume driving. Do not drive if you're concerned about vision problems, slowed thinking, slowed reaction time, reduced attention or poor judgment. Wear sunglasses even during winter if iza[place] while driving. The bright light may induce a headache. Alcohol use/Drug use  Don't drink alcohol or use recreational drugs as they may make you feel worse and/or hide the warning signs.         Follow-up:  Trauma Clinic: [phone] -- press option [address]  [place], [phone] D[place]ry [place]

## 2022-10-10 NOTE — DISCHARGE SUMMARY
Physician Discharge Summary     Patient ID:  Juanda Fothergill  [id number]  23 y.o.  1995    Admit date: 10/8/2022    Discharge date and time: 10/11/22     Admitting Physician: Zac Ordonez MD     Admission Diagnoses: [facility] ([facility] [I60.9]  Subdural hematoma [S06. 5XAA]  SDH (subdural hematoma) [S06. 5XAA]  Intraparenchymal hemorrhage of brain ([facility] [I61.9]  Laceration of scalp, initial encounter [S01.01XA]  Closed fracture of left side of occipital bone, unspecified occipital fracture type, initial encounter ([facility] [S99.570S]    Discharge Diagnoses: Principal Problem:    SDH (subdural hematoma)  Resolved Problems:    * No resolved hospital problems. *      Admission Condition: fair    Discharged Condition: stable    Indication for Admission: Fall, Basilar skull fx     Hospital Course/Procedures/Operation/treatments:   10/9: Patient presented after a fall while intoxicated, tripped and hit his head on asphalt. Was found to have a basilar fracture. And subdural hematoma. Keppra was initiated for 7 days. Neurosurgery was consulted. 10/10: No new injuries on tertiary examination. Nicotine patch added. Consults:   IP CONSULT TO TRAUMA SURGERY  IP CONSULT TO NEUROSURGERY  IP CONSULT TO SOCIAL WORK  IP CONSULT TO PSYCHIATRY    Significant Diagnostic Studies:   XR PELVIS (1-2 VIEWS)    Result Date: 10/9/2022  EXAMINATION: ONE XRAY VIEW OF THE PELVIS 10/9/2022 10:40 am COMPARISON: None. HISTORY: ORDERING SYSTEM PROVIDED HISTORY: fall, skull fracture, trauma TECHNOLOGIST PROVIDED HISTORY: Reason for exam:->fall, skull fracture, trauma What reading provider will be dictating this exam?->CRC FINDINGS: No evidence for acute fracture or dislocation. Bony architecture within normal limits. However tiny sclerotic lesion left femoral neck is noted felt represent bone island. There is contrast within distended urinary bladder.      Contrast distention of the urinary bladder otherwise negative evaluation of the pelvis. CT HEAD WO CONTRAST    Result Date: 10/9/2022  EXAMINATION: CT OF THE HEAD WITHOUT CONTRAST  10/9/2022 3:37 am TECHNIQUE: CT of the head was performed without the administration of intravenous contrast. Automated exposure control, iterative reconstruction, and/or weight based adjustment of the mA/kV was utilized to reduce the radiation dose to as low as reasonably achievable. COMPARISON: None. HISTORY: ORDERING SYSTEM PROVIDED HISTORY: ICH stable? TECHNOLOGIST PROVIDED HISTORY: Reason for exam:->ICH stable? Has a \"code stroke\" or \"stroke alert\" been called? ->No Decision Support Exception - unselect if not a suspected or confirmed emergency medical condition->Emergency Medical Condition (MA) What reading provider will be dictating this exam?->CRC FINDINGS: BRAIN/VENTRICLES: There is no significant interval change in the trace interhemispheric subdural hematoma identified. There is no significant interval change in the trace left frontal convexity and left  anterior cranial fossa subdural hematoma measuring up to 2 mm in thickness. No significant interval change in the small amount of subarachnoid blood products identified along the left frontal and left temporal convexity. No significant interval change in small parenchymal contusions identified in left frontal lobe and left temporal lobe. There is a nondisplaced occipital bone fracture. ORBITS: The visualized portion of the orbits demonstrate no acute abnormality. SINUSES: The visualized paranasal sinuses and mastoid air cells demonstrate no acute abnormality. SOFT TISSUES/SKULL:  No acute abnormality of the visualized skull or soft tissues. Unchanged 11 septal bone fracture. No significant change in trace subdural hematoma along the left frontal convexity and interhemispheric fissure. No significant interval change in trace subarachnoid blood products identified.  No significant interval change in small parenchymal contusions in left frontal and left temporal lobes. CT Head WO Contrast    Result Date: 10/8/2022  EXAMINATION: CT OF THE HEAD WITHOUT CONTRAST; CT OF THE CERVICAL SPINE WITHOUT CONTRAST 10/8/2022 9:44 pm TECHNIQUE: CT of the head was performed without the administration of intravenous contrast. Automated exposure control, iterative reconstruction, and/or weight based adjustment of the mA/kV was utilized to reduce the radiation dose to as low as reasonably achievable.; CT of the cervical spine was performed without the administration of intravenous contrast. Multiplanar reformatted images are provided for review. Automated exposure control, iterative reconstruction, and/or weight based adjustment of the mA/kV was utilized to reduce the radiation dose to as low as reasonably achievable. COMPARISON: None. HISTORY: ORDERING SYSTEM PROVIDED HISTORY: fall; head injury TECHNOLOGIST PROVIDED HISTORY: Reason for exam:->fall; head injury Has a \"code stroke\" or \"stroke alert\" been called? ->No Decision Support Exception - unselect if not a suspected or confirmed emergency medical condition->Emergency Medical Condition (MA) What reading provider will be dictating this exam?->CRC FINDINGS: CT HEAD: BRAIN/VENTRICLES: There is a small subdural hemorrhage and subarachnoid hemorrhage in the anterior left frontal lobe seen on series 302 image 31. Subdural hemorrhage and subarachnoid hemorrhage also identified in the medial aspect of the anterior right frontal lobe. Hemorrhage also identified in the anterior and inferior left temporal lobe on series 302, image 15. No additional evidence of intraparenchymal hemorrhage. Posterior fossa appears unremarkable. There is no evidence of midline shift or herniation. The ventricles and CSF containing spaces are otherwise unremarkable.   There is a small foci of air in the cavernous sinus on the right which is presumably present secondary to the placement of an IV catheter ORBITS: The visualized portion of the orbits demonstrate no acute abnormality. SINUSES: There appears to be some sphenoid sinus disease right greater than left. SOFT TISSUES/SKULL:  Posterior scalp contusion seen on series 302, image 36. skull fracture of the occiput which extends through the occipital condyle and into the left aspect of the foramen magnum seen on series 304, image 6. CT CERVICAL SPINE: BONES/ALIGNMENT: Partially imaged occipital skull fracture which extends into the left aspect of the foramen magnum. C1 arch appears intact. The remaining cervical spinal levels appear intact. Spinous processes are intact. The occipital atlantal and atlantoaxial articulation appears maintained. Normal appearance of the odontoid process. The facet joints appear appropriately aligned. Vertebral body height maintained. Straightening of the normal cervical lordosis. The remaining lateral alignment is preserved. DEGENERATIVE CHANGES: No significant degenerative changes. The central canal and neural foramen appear patent. SOFT TISSUES: Posterior paravertebral and prevertebral soft tissues appear unremarkable. Other: Airway is patent. No endobronchial lesions. Upper lungs are clear. No pneumothorax. 1.  Left occipital bone fracture extends through the occipital condyle and into the left aspect of the foramen magnum. Posterior scalp contusion. 2.  Left frontal lobe and to a lesser extent medial right frontal lobe subdural hematoma extending into the anterior falx. There are focal areas of subarachnoid hemorrhage involving the left frontal lobe, left temporal lobe, and medial right frontal lobe with parenchymal contusions in these regions. No evidence of midline shift or herniation at this time. 3.  No acute osseous findings in the cervical spine. Straightening of the normal cervical lordosis. Vertebral body heights maintained. 4.  No definite evidence of posterior fossa hemorrhage.   No definite evidence of sinus thrombosis at this time. Critical results were called by Dr. Yordy Perez to Dr. Sebastian De La Torre On 10/8/2022 at 23:17. RECOMMENDATIONS: Recommend close clinical follow-up and short interval repeat CT scan to assess for progression of hemorrhage. CT Cervical Spine WO Contrast    Result Date: 10/8/2022  EXAMINATION: CT OF THE HEAD WITHOUT CONTRAST; CT OF THE CERVICAL SPINE WITHOUT CONTRAST 10/8/2022 9:44 pm TECHNIQUE: CT of the head was performed without the administration of intravenous contrast. Automated exposure control, iterative reconstruction, and/or weight based adjustment of the mA/kV was utilized to reduce the radiation dose to as low as reasonably achievable.; CT of the cervical spine was performed without the administration of intravenous contrast. Multiplanar reformatted images are provided for review. Automated exposure control, iterative reconstruction, and/or weight based adjustment of the mA/kV was utilized to reduce the radiation dose to as low as reasonably achievable. COMPARISON: None. HISTORY: ORDERING SYSTEM PROVIDED HISTORY: fall; head injury TECHNOLOGIST PROVIDED HISTORY: Reason for exam:->fall; head injury Has a \"code stroke\" or \"stroke alert\" been called? ->No Decision Support Exception - unselect if not a suspected or confirmed emergency medical condition->Emergency Medical Condition (MA) What reading provider will be dictating this exam?->CRC FINDINGS: CT HEAD: BRAIN/VENTRICLES: There is a small subdural hemorrhage and subarachnoid hemorrhage in the anterior left frontal lobe seen on series 302 image 31. Subdural hemorrhage and subarachnoid hemorrhage also identified in the medial aspect of the anterior right frontal lobe. Hemorrhage also identified in the anterior and inferior left temporal lobe on series 302, image 15. No additional evidence of intraparenchymal hemorrhage. Posterior fossa appears unremarkable. There is no evidence of midline shift or herniation.   The ventricles and CSF containing spaces are otherwise unremarkable. There is a small foci of air in the cavernous sinus on the right which is presumably present secondary to the placement of an IV catheter ORBITS: The visualized portion of the orbits demonstrate no acute abnormality. SINUSES: There appears to be some sphenoid sinus disease right greater than left. SOFT TISSUES/SKULL:  Posterior scalp contusion seen on series 302, image 36. skull fracture of the occiput which extends through the occipital condyle and into the left aspect of the foramen magnum seen on series 304, image 6. CT CERVICAL SPINE: BONES/ALIGNMENT: Partially imaged occipital skull fracture which extends into the left aspect of the foramen magnum. C1 arch appears intact. The remaining cervical spinal levels appear intact. Spinous processes are intact. The occipital atlantal and atlantoaxial articulation appears maintained. Normal appearance of the odontoid process. The facet joints appear appropriately aligned. Vertebral body height maintained. Straightening of the normal cervical lordosis. The remaining lateral alignment is preserved. DEGENERATIVE CHANGES: No significant degenerative changes. The central canal and neural foramen appear patent. SOFT TISSUES: Posterior paravertebral and prevertebral soft tissues appear unremarkable. Other: Airway is patent. No endobronchial lesions. Upper lungs are clear. No pneumothorax. 1.  Left occipital bone fracture extends through the occipital condyle and into the left aspect of the foramen magnum. Posterior scalp contusion. 2.  Left frontal lobe and to a lesser extent medial right frontal lobe subdural hematoma extending into the anterior falx. There are focal areas of subarachnoid hemorrhage involving the left frontal lobe, left temporal lobe, and medial right frontal lobe with parenchymal contusions in these regions. No evidence of midline shift or herniation at this time. 3.  No acute osseous findings in the cervical spine. Straightening of the normal cervical lordosis. Vertebral body heights maintained. 4.  No definite evidence of posterior fossa hemorrhage. No definite evidence of sinus thrombosis at this time. Critical results were called by Dr. Jennifer Sotomayor to Dr. Afsaneh Hart On 10/8/2022 at 23:17. RECOMMENDATIONS: Recommend close clinical follow-up and short interval repeat CT scan to assess for progression of hemorrhage. XR CHEST PORTABLE    Result Date: 10/8/2022  EXAMINATION: ONE XRAY VIEW OF THE CHEST 10/8/2022 8:00 pm COMPARISON: None. HISTORY: ORDERING SYSTEM PROVIDED HISTORY: Shortness of breath TECHNOLOGIST PROVIDED HISTORY: Reason for exam:->Shortness of breath What reading provider will be dictating this exam?->CRC FINDINGS: The lungs are without acute focal process. There is no effusion or pneumothorax. The cardiomediastinal silhouette is without acute process. The osseous structures are without acute process. No acute process. CTA NECK W CONTRAST    Result Date: 10/9/2022  EXAMINATION: CTA OF THE NECK 10/9/2022 3:37 am TECHNIQUE: CTA of the neck was performed with the administration of intravenous contrast. Multiplanar reformatted images are provided for review. MIP images are provided for review. Stenosis of the internal carotid arteries measured using NASCET criteria. Automated exposure control, iterative reconstruction, and/or weight based adjustment of the mA/kV was utilized to reduce the radiation dose to as low as reasonably achievable. COMPARISON: CT cervical spine, CT head 10/08/2022 HISTORY: ORDERING SYSTEM PROVIDED HISTORY: occipital condyle fx, risk for vascular injury TECHNOLOGIST PROVIDED HISTORY: Reason for exam:->occipital condyle fx, risk for vascular injury Has a \"code stroke\" or \"stroke alert\" been called? ->No Decision Support Exception - unselect if not a suspected or confirmed emergency medical condition->Emergency Medical Condition (MA) What reading provider will be dictating this exam?->CRC FINDINGS: AORTIC ARCH/ARCH VESSELS: No dissection or arterial injury. No significant stenosis of the brachiocephalic or subclavian arteries. CAROTID ARTERIES: No dissection, arterial injury, or hemodynamically significant stenosis by NASCET criteria. VERTEBRAL ARTERIES: No dissection, arterial injury, or significant stenosis. SOFT TISSUES: The lung apices are clear. No cervical or superior mediastinal lymphadenopathy. The larynx and pharynx are unremarkable. No acute abnormality of the salivary and thyroid glands. BONES: Partially imaged nondisplaced left occipital bone fracture. No dissection, arterial injury, or hemodynamically significant stenosis of the major arteries of the neck. Discharge Exam:  No apparent distress, flat affect  Pupils equal round reactive to light,  Breathing comfortably lungs clear bilaterally  Heart regular rate rhythm, S1-S2  Abdomen soft nontender nondistended    Disposition: home    In process/preliminary results:  Outstanding Order Results       No orders found from 9/9/2022 to 10/9/2022. Patient Instructions:   Current Discharge Medication List             Details   butalbital-acetaminophen-caffeine (FIORICET, ESGIC) -40 MG per tablet Take 1 tablet by mouth every 4 hours as needed for Headaches  Qty: 84 tablet, Refills: 0      levETIRAcetam (KEPPRA) 500 MG tablet Take 1 tablet by mouth 2 times daily for 5 days  Qty: 10 tablet, Refills: 0      oxyCODONE (ROXICODONE) 5 MG immediate release tablet Take 1 tablet by mouth every 6 hours as needed for Pain for up to 7 days. Qty: 28 tablet, Refills: 0    Comments: Reduce doses taken as pain becomes manageable  Associated Diagnoses: Closed fracture of left side of occipital bone, unspecified occipital fracture type, initial encounter ([facility]             [address]    Call [phone], option 2, for any questions/concerns and for follow-up appointment in 2 week(s).     Please follow the instructions checked below:    Please follow-up with your primary care provider. ACTIVITY INSTRUCTIONS  Increase activity as tolerated  No heavy lifting or strenuous activity  Take your incentive spirometer home and use 4-6 times/day   [x]  No driving until cleared by neurosurgery    WOUND/DRESSING INSTRUCTIONS:  You may shower. No sitting in bath tub, hot tub or swimming until cleared by physician. Ice to areas of pain for first 24 hours. Heat to areas of pain after that. Wash areas of lacerations/abrasions with soap & water. Rinse well. Pat dry with clean towel. Apply thin layer of Bacitracin, Neosporin, or triple antibiotic cream to affected area 2-3 times per day. Keep wounds clean and dry. []  Sutures/Staples are to be removed    MEDICATION INSTRUCTIONS  Take medication as prescribed. When taking pain medications, you may experience dizziness or drowsiness. Do not drink alcohol or drive when taking these medications. You may experience constipation while taking pain medication. You may take over the counter stool softeners such as docusate (Colace), sennosides S (Senokot-S), or Miralax.   []  You may take Ibuprofen (over the counter) as directed for mild pain. --You may take up to 800mg every 8 hours for pain, please take with food or milk. [x]  You may take acetaminophen (Tylenol) products. Do NOT take more than 4000mg of Tylenol in 24h. [x]  Do not take any other acetaminophen (Tylenol) products if you are taking Percocet or Norco, as these contain Tylenol. --Do NOT take more than 4000mg of Tylenol in 24h. OPIOID MEDICATION INSTRUCTIONS  Read the medication guide that is included with your prescription. Take your medication exactly as prescribed. Store medication away from children and in a safe place. Do NOT share your medication with others. Do NOT take medication unless it is prescribed for you.   Do NOT drink alcohol while taking opioids (I.e., Norco, Percocet, Oxycodone, etc). Discuss with the Trauma Clinic staff if the dose of medication you are taking does not control your pain and any side effects that you may be having. CALL 911 OR YOUR LOCAL EMERGENCY SERVICE:  --If you take too much medication  --If you have trouble breathing or shortness of breath  --A child has taken this medication. WORK:  You may not return to work until you receive follow-up with the Trauma Clinic or clearance by all consultants. Call the trauma clinic for any of the following or for questions/concerns;  --fever over 101F  --redness, swelling, hardness or warmth at the wound site(s). --Unrelieved nausea/vomiting  --Foul smelling or cloudy drainage at the wound site(s)  --Unrelieved pain or increase in pain  --Increase in shortness of breath    Follow-up:  Trauma Clinic: [phone] [place], [place]  [address]      MILD TRAUMATIC BRAIN INJURY OR CONCUSSION  A mild traumatic brain injury (TBI) is one that causes some degree of injury to the brain causing symptoms ranging from a brief period of confusion to loss of consciousness (being knocked out). There is no major bruising or bleeding in the brain but symptoms can last from hours to months depending on the severity of the injury. Family or friends need to observe any change in behavior for the next 48 hours. Delayed effects from head injury do occur occasionally and can be due to slow bleeding or swelling around the surface of the brain. These effects may occur even if the x-rays/CT scans were normal.  Please observe the following symptoms during the next 24-48 hours. CALL 911 if:  Pupils (black part in the center of the eye) are unequal in size, and this is new. Seizure (convulsion).   Not responding to others/won't wake up or very hard to wake up  Faints (passes out)  Vomiting more than 3 times    Notify the TRAUMA CLINIC if any of the following symptoms occur:  Severe headache -- Mild headache may last for days. Report worsening pain or uncontrolled pain with prescribed medicine. Numbness, tingling or weakness -- Present in arms or legs; unsteady walking. Eye Changes/light sensation -- Vision problems; blurred or double-vision; unequal sized pupils. Nausea/Vomiting -- Episodes of vomiting may occur initially after a head injury. Persistent vomiting or difficulty taking medication by mouth. Increased Sleepiness -- Difficulty waking from sleep with increased confusion. Dizziness -- Does not go away or occurs repeatedly. Vomiting may accompany dizziness. Drainage -- Clear fluid or blood from the nose and ears. Fever -- Temperature over 101 degrees. Neck Pain. The First Four Weeks  The symptoms below are common after a mild brain injury. They usually get better on their own within a few weeks:   feeling tired or ?low  problems falling or staying asleep  feeling confused, poor concentration, or slow to answer questions  feeling dizzy, poor balance, or poor coordination  being sensitive to light  being sensitive to sounds  ringing in the ears  a mild headache, sometimes with nausea and/or vomiting  being irritable, having mood swings, or feeling somewhat sad or down  Contact the [address] [place] [address] Road if your symptoms are affecting your everyday activities. Remember that letting yourself get too tired can make your symptoms worse. Listen to your body: if doing a certain activity increases your symptoms, take a break from that activity. Build up the amount of time you do an activity and stay under the threshold of symptoms. Long term Effects (Post-Concussive Syndrome)    Notify physician if any of the following persists longer than 2-4 weeks.     Difficulty with concentration or attention (easily distracted)  Frequent headaches  Memory problems   Sensitivity to light   Sleeping difficulties      There is a higher risk of having a more serious head injury if:  Previous history of head injury or concussion  Taking medicine that thins your blood, or have a bleeding disorder  Have other neurologic (brain) problems  Have difficulty walking or frequent falls  Active in high impact contact sports, like soccer or football. Activities  Stay away from activities that could cause another head injury (like sports), until the doctor says it's okay. A second blow to the head can cause more damage to the brain  Limit reading, television, video games, etc. the first 48 hours. Your brain needs to rest so that it can recover. You may find that it helps to take time off school or work. Limit exposure to bright lights, loud noises, and crowds for the first 48 hours, as these can make your symptoms worse  Limit use of screens, such as an IPad, computer, cellphone, TV, etc, as these can make symptoms, especially headaches, worse. Work/School  It is recommended that you wait to return to work/school until after your follow-up appointment with trauma or your family doctor. If you are having no symptoms, please call for an earlier appointment  Some people find it hard to concentrate well so return to your normal activities slowly. Go back to work or school for half days at first, and increase as tolerated. Trauma services can help you with a graduated schedule. If you feel comfortable doing so, tell work or school about your concussion. You may have to adjust your activities, depending on your job or school demands. Rest and Sleep  Rest for the first 24 hours; it's one of the best things to help your brain recover. It's okay to sleep if you want  You don't have to be woken up every few hours. If someone does wake you up, you should awaken easily. Do some light physical activity (housework) or light exercise (walking, stationary bike) as soon as you can tolerate the movement.   Strenuous exercise (such as jogging or weight lifting) can make your concussion symptoms worse or last longer. Diet  Return to a normal diet as tolerated, you may want to start with liquids first  Eat healthy meals (including breakfast) and snacks throughout the day as your brain heals. Managing Pain  Tylenol or Ibuprofen are the best meds to take for headaches. Your doctor may have prescribed you medications if your headaches were severe or you have other injuries. Please take as directed. Driving  Your ability to concentrate and react quickly might be affected by the concussion. Please contact trauma services for advice on when to resume driving. Do not drive if you're concerned about vision problems, slowed thinking, slowed reaction time, reduced attention or poor judgment. Wear sunglasses even during winter if izaiah while driving. The bright light may induce a headache. Alcohol use/Drug use  Don't drink alcohol or use recreational drugs as they may make you feel worse and/or hide the warning signs.         Follow-up:  Trauma Clinic: [phone] -- press option [phone] [facility]  [place], [address] [place]     Follow up:   [address]  5 [facility] [phone]  Schedule an appointment as soon as possible for a visit in 2 week(s)  for follow up     Time spent on discharge: more than 30 minutes  Signed:  MYLA Merchant NP  10/11/2022  11:26 AM

## 2022-10-10 NOTE — PROGRESS NOTES
SPEECH/LANGUAGE PATHOLOGY  SPEECH/LANGUAGE/COGNITIVE EVALUATION   and PLAN OF CARE      PATIENT NAME:  Carolyn Hurtado  (male)     MRN:  [id number]    :  1995  (32 y.o.)  STATUS:  Inpatient: Room 4518/4518-B    TODAY'S DATE:  10/10/2022  REFERRING PROVIDER:   Shweta Chand DO    SPECIFIC PROVIDER ORDER: Speech language pathology evaluation  Date of order:  10-9-22  REASON FOR REFERRAL: trauma  EVALUATING THERAPIST: LISET Wood    ADMITTING DIAGNOSIS: Subarachnoid hemorrhage ([address] [I60.9]  Subdural hematoma [S06. 5XAA]  SDH (subdural hematoma) [S06. 5XAA]  Intraparenchymal hemorrhage of brain ([address] [I61.9]  Laceration of scalp, initial encounter [S01.01XA]  Closed fracture of left side of occipital bone, unspecified occipital fracture type, initial encounter ([address] [S02.119A]    VISIT DIAGNOSIS:   Visit Diagnoses         Codes    Subarachnoid hemorrhage ([address]    -  Primary I60.9    Closed fracture of left side of occipital bone, unspecified occipital fracture type, initial encounter ([address]     S02.119A    Subdural hematoma     S06. 5XAA    Intraparenchymal hemorrhage of brain (HCC)     I61.9    Laceration of scalp, initial encounter     S01. 01XA             SPEECH THERAPY  PLAN OF CARE   The speech therapy  POC is established based on physician order, speech pathology diagnosis and results of clinical assessment     SPEECH PATHOLOGY DIAGNOSIS:    Within function limits    Speech Pathology intervention is not warranted at this time.      Conditions Requiring Skilled Therapeutic Intervention for speech, language and/or cognition  Not applicable     Specific Speech Therapy Interventions to Include:   Not applicable    Specific instructions for next treatment:    Not applicable    SHORT/LONG TERM GOALS  Not applicable      Patient goals: Patient/family involved in developing goals and treatment plan:   Treatment goals discussed with Patient    The Patient understand(s) the diagnosis, prognosis and plan of care The patient/family Did not state,     This plan may be re-evaluated and revised as warranted. Rehabilitation Potential/Prognosis: not applicable                CLINICAL ASSESSMENT:  MOTOR SPEECH       Oral Peripheral Examination   Adequate lingual/labial strength     Parameters of Speech Production  Respiration:  Adequate for speech production  Articulation:  Within functional limits  Resonance:  Within functional limits  Quality:   Within functional limits  Pitch: Within functional limits  Intensity: Within functional limits  Fluency:  Intact  Prosody Intact    RECEPTIVE LANGUAGE    Comprehension of Yes/No Questions: Within functional limits    Process  Simple Verbal Commands:   Within functional limits  Process Intermediate Verbal Commands:   Within functional limits  Process Complex Verbal Commands:     Within functional limits    Comprehension of Conversation:      Within functional limits      EXPRESSIVE LANGUAGE     Serials: Functional    Imitation:  Words   Functional   Sentences Functional    Naming:  (Modality used:  Verbal)  Confrontation Naming  Functional  Functional Description  Functional  Response Naming: Functional    Conversation:      Conversation was within functional limits    COGNITION     Attention/Orientation  Attention: Sustained attention   Orientation:  Oriented to Person, Place, Date, Reason for hospitalization    Memory   Immediate Recall: Repeated 3/3    Delayed Recall:   Recalled  2/3  Long Term Recall:   Recalled Address, Birthdate, Age, and Family    Organization/Problem Solving/Reasoning   Verbal Sequencing:   Functional        Verbal Problem solving:   Functional          CLINICAL OBSERVATIONS NOTED DURING THE EVALUATION  Latent responses due to cooperation                  EDUCATION:   The Speech Language Pathologist (SLP) completed education regarding results of evaluation and that intervention is not warranted at this time.   Learner: Patient  Education: Reviewed results and recommendations of this evaluation  Evaluation of Education:  Verbalizes understanding    Evaluation Time includes thorough review of current medical information, gathering information on past medical history/social history and prior level of function, completion of standardized testing/informal observation of tasks, assessment of data and education on plan of care and goals. CPT code:    96873  eval speech sound lang comprehension      The admitting diagnosis and active problem list, as listed below have been reviewed prior to initiation of this evaluation.         ACTIVE PROBLEM LIST:   Patient Active Problem List   Diagnosis    SDH (subdural hematoma)

## 2022-10-10 NOTE — PROGRESS NOTES
Trauma Tertiary Survey    Admit Date: 10/8/2022  Hospital day 1    CC:  Fall    Alcohol pre-screening:  Men: How many times in the past year have you had 5 or more drinks in a day?  1 or more  How much do you drink on a daily basis? Not daily    Drug Pre-screening:    How many times in the past year have you used a recreational drug or used a prescription medication for non medical reasons? Yes    Mood Prescreening:    During the past two weeks, have you been bothered by little interest or pleasure doing things? No  During the past two weeks, have you been bothered by feeling down, depressed or hopeless? No      Scheduled Meds:   nicotine  1 patch TransDERmal Daily    sodium chloride flush  5-40 mL IntraVENous 2 times per day    bacitracin zinc   Topical TID    sennosides-docusate sodium  1 tablet Oral BID    levETIRAcetam  500 mg Oral BID     Continuous Infusions:   sodium chloride 100 mL/hr at 10/09/22 0448    sodium chloride       PRN Meds:sodium chloride flush, sodium chloride, acetaminophen, oxyCODONE **OR** oxyCODONE, ondansetron **OR** ondansetron, polyethylene glycol, hydrALAZINE, labetalol, sodium chloride flush    Subjective:     Complaining of head pain. Denies nausea or vomiting or new complaints. Objective:   Patient Vitals for the past 8 hrs:   BP Temp Temp src Pulse Resp SpO2   10/09/22 2149 -- -- -- -- 18 --   10/09/22 2117 139/84 97.5 °F (36.4 °C) Oral 89 16 99 %       I/O last 3 completed shifts: In: 100 [IV Piggyback:100]  Out: 950 [Urine:950]  No intake/output data recorded. History reviewed. No pertinent past medical history. @homemeds@    Radiology:  XR PELVIS (1-2 VIEWS)   Final Result   Contrast distention of the urinary bladder otherwise negative evaluation of   the pelvis. CT HEAD WO CONTRAST   Final Result   Unchanged 11 septal bone fracture. No significant change in trace subdural hematoma along the left frontal   convexity and interhemispheric fissure.       No significant interval change in trace subarachnoid blood products   identified. No significant interval change in small parenchymal contusions in left   frontal and left temporal lobes. CTA NECK W CONTRAST   Final Result   No dissection, arterial injury, or hemodynamically significant stenosis of   the major arteries of the neck. CT Head WO Contrast   Final Result   1. Left occipital bone fracture extends through the occipital condyle and   into the left aspect of the foramen magnum. Posterior scalp contusion. 2.  Left frontal lobe and to a lesser extent medial right frontal lobe   subdural hematoma extending into the anterior falx. There are focal areas of   subarachnoid hemorrhage involving the left frontal lobe, left temporal lobe,   and medial right frontal lobe with parenchymal contusions in these regions. No evidence of midline shift or herniation at this time. 3.  No acute osseous findings in the cervical spine. Straightening of the   normal cervical lordosis. Vertebral body heights maintained. 4.  No definite evidence of posterior fossa hemorrhage. No definite evidence   of sinus thrombosis at this time. Critical results were called by Dr. Yoni Batista to Dr. Volodymyr Noel On 10/8/2022   at 23:17. RECOMMENDATIONS:   Recommend close clinical follow-up and short interval repeat CT scan to   assess for progression of hemorrhage. CT Cervical Spine WO Contrast   Final Result   1. Left occipital bone fracture extends through the occipital condyle and   into the left aspect of the foramen magnum. Posterior scalp contusion. 2.  Left frontal lobe and to a lesser extent medial right frontal lobe   subdural hematoma extending into the anterior falx. There are focal areas of   subarachnoid hemorrhage involving the left frontal lobe, left temporal lobe,   and medial right frontal lobe with parenchymal contusions in these regions.    No evidence of midline shift or herniation at this time. 3.  No acute osseous findings in the cervical spine. Straightening of the   normal cervical lordosis. Vertebral body heights maintained. 4.  No definite evidence of posterior fossa hemorrhage. No definite evidence   of sinus thrombosis at this time. Critical results were called by Dr. Lisa Castillo to Dr. Adrian Cordoba On 10/8/2022   at 23:17. RECOMMENDATIONS:   Recommend close clinical follow-up and short interval repeat CT scan to   assess for progression of hemorrhage. XR CHEST PORTABLE   Final Result   No acute process. PHYSICAL EXAM:     Central Nervous System  Loss of consciousness:  No    GCS:    Eye:  4 - Opens eyes on own  Motor:  6 - Follows simple motor commands  Verbal:  5 - Alert and oriented    Neuromuscular blockade: No  Pupil size:  Left 2 mm    Right 2 mm  Pupil reaction: Yes    Wiggles fingers: Left Yes Right Yes  Wiggles toes: Left Yes   Right Yes    Hand grasp:   Left  Present      Right  Present  Plantar flexion: Left  Present      Right   Present    PHYSICAL EXAM  General: No apparent distress, comfortable   HEENT: Trachea midline, no masses, Pupils equal round   Chest: Respiratory effort was normal with no retractions or use of accessory muscles. On room air, mildly tender to palpation  Cardiovascular: Extremities warm, well perfused, RRR  Abdomen:  Soft and non distended.   No tenderness, guarding, rebound, or rigidity   Extremities: Moves all 4 extremeties, No pedal edema     Spine:   Spine Tenderness ROM   Cervical 5/10 Normal   Thoracic 0 /10 Normal   Lumbar 0 /10 Normal     Musculoskeletal:    Joint Tenderness Swelling ROM   Right shoulder Absent absent normal   Left shoulder absent absent normal   Right elbow absent absent normal   Left elbow absent absent normal   Right wrist absent absent normal   Left wrist absent absent normal   Right hand grasp Absent absent normal   Left hand grasp absent absent normal   Right hip absent absent normal   Left hip absent absent normal   Right knee Absent absent normal   Left knee absent absent normal   Right ankle absent absent normal   Left ankle absent absent normal   Right foot Absent absent normal   Left foot absent absent normal       CONSULTS: Neurosurgery    PROCEDURES: none    INJURIES:      Principal Problem:    SDH (subdural hematoma)  Resolved Problems:    * No resolved hospital problems. *        Assessment/Plan:       Neuro:  GCS 15, Basilar skull fx, SDH, keppra, NSG following  CV: HR near normal limits, no acute issues   Pulm: tolerating room air    GI: tolerating general diet   Renal: no acute issues   ID: afebrile, no acute issues     Endocrine: no acute issues,   MSK: no acute issues    Heme: no acute issues      Bowel regime: senna  Pain control/Sedation: oxycodone, tylenol   DVT prophylaxis: scds    GI: diet as tolerated  Glucose protocol: daily checks  Mouth/Eye care: per patient.    Chavez: none     Code status:    Full Code    Patient/Family update:  As available     Disposition:  continue current care       Electronically signed by Aura Hardwick MD on 10/10/22 at 2:19 AM EDT

## 2022-10-10 NOTE — PROGRESS NOTES
Occupational Therapy  Attempted OT eval at b/s, however, pt adamantly declined to participate in assessment ax despite much encouragement and pt ed re; benefits of participating in therapy. Observed pt sitting INDly in long-sitting and at EOB. Safely  reported pt ambulating w/ SUP for safety to the bathroom for toileting. Will attempt OT assessment at a later time.   Thank you for this referral. DISHA Unger, OTR/L  # [id number]

## 2022-10-11 VITALS
SYSTOLIC BLOOD PRESSURE: 142 MMHG | WEIGHT: 161.9 LBS | HEIGHT: 72 IN | DIASTOLIC BLOOD PRESSURE: 87 MMHG | OXYGEN SATURATION: 96 % | BODY MASS INDEX: 21.93 KG/M2 | TEMPERATURE: 97 F | RESPIRATION RATE: 16 BRPM | HEART RATE: 74 BPM

## 2022-10-11 LAB
ANION GAP SERPL CALCULATED.3IONS-SCNC: 15 MMOL/L (ref 7–16)
BASOPHILS ABSOLUTE: 0.05 E9/L (ref 0–0.2)
BASOPHILS RELATIVE PERCENT: 0.6 % (ref 0–2)
BUN BLDV-MCNC: 7 MG/DL (ref 6–20)
CALCIUM SERPL-MCNC: 9.7 MG/DL (ref 8.6–10.2)
CHLORIDE BLD-SCNC: 98 MMOL/L (ref 98–107)
CO2: 25 MMOL/L (ref 22–29)
CREAT SERPL-MCNC: 0.8 MG/DL (ref 0.7–1.2)
EOSINOPHILS ABSOLUTE: 0.08 E9/L (ref 0.05–0.5)
EOSINOPHILS RELATIVE PERCENT: 0.9 % (ref 0–6)
GFR AFRICAN AMERICAN: >60
GFR NON-AFRICAN AMERICAN: >60 ML/MIN/1.73
GLUCOSE BLD-MCNC: 83 MG/DL (ref 74–99)
HCT VFR BLD CALC: 42.2 % (ref 37–54)
HEMOGLOBIN: 14.6 G/DL (ref 12.5–16.5)
IMMATURE GRANULOCYTES #: 0.03 E9/L
IMMATURE GRANULOCYTES %: 0.3 % (ref 0–5)
LYMPHOCYTES ABSOLUTE: 1.72 E9/L (ref 1.5–4)
LYMPHOCYTES RELATIVE PERCENT: 20 % (ref 20–42)
MCH RBC QN AUTO: 31.8 PG (ref 26–35)
MCHC RBC AUTO-ENTMCNC: 34.6 % (ref 32–34.5)
MCV RBC AUTO: 91.9 FL (ref 80–99.9)
MONOCYTES ABSOLUTE: 0.92 E9/L (ref 0.1–0.95)
MONOCYTES RELATIVE PERCENT: 10.7 % (ref 2–12)
NEUTROPHILS ABSOLUTE: 5.82 E9/L (ref 1.8–7.3)
NEUTROPHILS RELATIVE PERCENT: 67.5 % (ref 43–80)
PDW BLD-RTO: 12.4 FL (ref 11.5–15)
PLATELET # BLD: 235 E9/L (ref 130–450)
PMV BLD AUTO: 10.4 FL (ref 7–12)
POTASSIUM REFLEX MAGNESIUM: 4.3 MMOL/L (ref 3.5–5)
RBC # BLD: 4.59 E12/L (ref 3.8–5.8)
SODIUM BLD-SCNC: 138 MMOL/L (ref 132–146)
WBC # BLD: 8.6 E9/L (ref 4.5–11.5)

## 2022-10-11 PROCEDURE — 99238 HOSP IP/OBS DSCHRG MGMT 30/<: CPT | Performed by: SURGERY

## 2022-10-11 PROCEDURE — 36415 COLL VENOUS BLD VENIPUNCTURE: CPT

## 2022-10-11 PROCEDURE — 99239 HOSP IP/OBS DSCHRG MGMT >30: CPT | Performed by: CLINICAL NURSE SPECIALIST

## 2022-10-11 PROCEDURE — 80048 BASIC METABOLIC PNL TOTAL CA: CPT

## 2022-10-11 PROCEDURE — 97165 OT EVAL LOW COMPLEX 30 MIN: CPT

## 2022-10-11 PROCEDURE — 6370000000 HC RX 637 (ALT 250 FOR IP)

## 2022-10-11 PROCEDURE — 97161 PT EVAL LOW COMPLEX 20 MIN: CPT

## 2022-10-11 PROCEDURE — 85025 COMPLETE CBC W/AUTO DIFF WBC: CPT

## 2022-10-11 RX ORDER — BUTALBITAL, ACETAMINOPHEN AND CAFFEINE 50; 325; 40 MG/1; MG/1; MG/1
1 TABLET ORAL EVERY 4 HOURS PRN
Qty: 84 TABLET | Refills: 0 | Status: SHIPPED | OUTPATIENT
Start: 2022-10-11 | End: 2022-11-01 | Stop reason: SDUPTHER

## 2022-10-11 RX ORDER — OXYCODONE HYDROCHLORIDE 5 MG/1
5 TABLET ORAL EVERY 6 HOURS PRN
Qty: 28 TABLET | Refills: 0 | Status: SHIPPED | OUTPATIENT
Start: 2022-10-11 | End: 2022-10-18

## 2022-10-11 RX ORDER — LEVETIRACETAM 500 MG/1
500 TABLET ORAL 2 TIMES DAILY
Qty: 10 TABLET | Refills: 0 | Status: SHIPPED | OUTPATIENT
Start: 2022-10-11 | End: 2022-10-21 | Stop reason: SDUPTHER

## 2022-10-11 RX ORDER — BUTALBITAL, ACETAMINOPHEN AND CAFFEINE 50; 325; 40 MG/1; MG/1; MG/1
1 TABLET ORAL EVERY 4 HOURS PRN
Status: DISCONTINUED | OUTPATIENT
Start: 2022-10-11 | End: 2022-10-11 | Stop reason: HOSPADM

## 2022-10-11 RX ADMIN — OXYCODONE 5 MG: 5 TABLET ORAL at 08:58

## 2022-10-11 RX ADMIN — SENNOSIDES AND DOCUSATE SODIUM 1 TABLET: 50; 8.6 TABLET ORAL at 08:58

## 2022-10-11 RX ADMIN — LEVETIRACETAM 500 MG: 500 TABLET, FILM COATED ORAL at 08:58

## 2022-10-11 RX ADMIN — OXYCODONE HYDROCHLORIDE 10 MG: 10 TABLET ORAL at 03:25

## 2022-10-11 RX ADMIN — BACITRACIN ZINC: 500 OINTMENT TOPICAL at 08:59

## 2022-10-11 ASSESSMENT — PAIN SCALES - GENERAL
PAINLEVEL_OUTOF10: 5
PAINLEVEL_OUTOF10: 5
PAINLEVEL_OUTOF10: 7

## 2022-10-11 ASSESSMENT — PAIN DESCRIPTION - DESCRIPTORS
DESCRIPTORS: ACHING;DISCOMFORT;DULL
DESCRIPTORS: ACHING;DISCOMFORT;DULL

## 2022-10-11 ASSESSMENT — PAIN DESCRIPTION - PAIN TYPE: TYPE: ACUTE PAIN

## 2022-10-11 ASSESSMENT — PAIN DESCRIPTION - FREQUENCY: FREQUENCY: INTERMITTENT

## 2022-10-11 ASSESSMENT — PAIN DESCRIPTION - ONSET: ONSET: ON-GOING

## 2022-10-11 ASSESSMENT — PAIN DESCRIPTION - LOCATION
LOCATION: EYE;HAND
LOCATION: HEAD

## 2022-10-11 ASSESSMENT — PAIN - FUNCTIONAL ASSESSMENT: PAIN_FUNCTIONAL_ASSESSMENT: ACTIVITIES ARE NOT PREVENTED

## 2022-10-11 NOTE — PROGRESS NOTES
[facility]  ATTENDING PHYSICIAN PROGRESS NOTE      I personally saw, examined and provided care for the patient. Radiographs, labs and medications were reviewed by me independently. The case was discussed in detail and plans for care were established. Review of Residents documentation was conducted and revisions were made as appropriate. I agree with the above documented exam, problem list and plan of care. The following summarizes my clinical findings and independent assessment. CC: Subdural hemorrhage, basilar skull fracture after fall    S. Patient has been ambulating in his room. He wishes to go home. He is neuro intact. O.  No apparent distress, flat affect, GCS 15 awake and alert x3  Pupils equal round reactive to light,  Breathing comfortably lungs clear bilaterally  Heart regular rate rhythm, S1-S2  Abdomen soft nontender nondistended    ASSESSMENT:  Principal Problem:    SDH (subdural hematoma)  Resolved Problems:    * No resolved hospital problems. *       PLAN:  Subdural hematoma-stable. Continue Keppra for early TBI seizure prophylaxis-total 7 days  Basilar skull fracture-nonoperative management CTA neck was negative for blunt cerebrovascular injury    Alcohol intoxication-needs SBI by social work    [employer]    PT OT    DVT Proph: SCDS/Lovenox      Dispo-patient resides at home with his grandmother. He has 24-hour supervision at home  He is awake and alert. Although he declined therapy yesterday he has been ambulating walking in the bathroom. I visualized him walking in his room and walked to the door and down the hallway. Will discharge home    Refer to discharge summary as part of the discharge planning. Josephine Young MD, FACS  10/11/2022  11:37 AM    NOTE: This report was transcribed using voice recognition software. Every effort was made to ensure accuracy; however, inadvertent computerized transcription errors may be present.

## 2022-10-11 NOTE — PROGRESS NOTES
Physical Therapy      Initial Assessment     Name: Zeynep Mitchell  : 1995  MRN: [id number]      Date of Service: 10/11/2022    Evaluating PT: Jennifer Rodriguez, PT, DPT [id number]      Room #:  9551/1952-D  Diagnosis:  Subarachnoid hemorrhage ([facility] [I60.9]  Subdural hematoma [S06. 5XAA]  SDH (subdural hematoma) [S06. 5XAA]  Intraparenchymal hemorrhage of brain (HCC) [I61.9]  Laceration of scalp, initial encounter [S01.01XA]  Closed fracture of left side of occipital bone, unspecified occipital fracture type, initial encounter ([facility] [S02.119A]  Precautions:  Fall risk, SAH, occipital bone fx, impulsive,     SUBJECTIVE:    Pt lives with grandmother in a 2 story house with 3 stair(s) and no rail(s) to enter. Full flight of stairs and 1 rail to second floor bed. Derick Pearce is on the first floor. Pt ambulated without AD prior to admission. OBJECTIVE:   Initial Evaluation  Date: 10/11/22 Treatment Date: Short Term/ Long Term   Goals   AM-PAC 6 Clicks 93/03     Was pt agreeable to Eval/treatment? Yes     Does pt have pain? /10 HA; recently medicated     Bed Mobility  Rolling: NT  Supine to sit: Supervision  Sit to supine: Supervision  Scooting: Supervision to EOB  Rolling: Independent   Supine to sit: Independent   Sit to supine: Independent   Scooting: Independent    Transfers Sit to stand: SBA  Stand to sit: SBA  Stand pivot: SBA without AD  Sit to stand: Independent   Stand to sit: Independent   Stand pivot: Independent    Ambulation   200 feet without AD with CGA  >400 feet Independent    Stair negotiation: ascended and descended NT  >12 step(s) with 1 rail(s) Mod Independent    ROM BUE: Refer to OT note  BLE: WFL     Strength BUE: Refer to OT note  BLE: 5/5     Balance Sitting EOB: Supervision  Dynamic Standing: SBA without AD  Sitting EOB: Independent   Dynamic Standing: Independent      Pt is A & O x: 4 to person, place, month/year, and situation.   Sensation: Pt denies numbness and tingling of encounter [S01.01XA]  Closed fracture of left side of occipital bone, unspecified occipital fracture type, initial encounter ([facility] [S02.119A]  Specific instructions for next treatment:  Increase activity. Current Treatment Recommendations:     [] Strengthening to improve independence with functional mobility   [] ROM to improve independence with functional mobility   [x] Balance Training to improve static/dynamic balance and to reduce fall risk  [] Endurance Training to improve activity tolerance during functional mobility   [] Transfer Training to improve safety and independence with all functional transfers   [x] Gait Training to improve gait mechanics, endurance and assess need for appropriate assistive device  [] Stair Training in preparation for safe discharge home and/or into the community   [] Positioning to prevent skin breakdown and contractures  [x] Safety and Education Training   [] Patient/Caregiver Education   [] HEP  [] Other     PT long term treatment goals are located in above grid    Frequency of treatments: 2-5x/week x 1-2 days. Time in  0935  Time out  0945    Total Treatment Time  0 minutes     Evaluation Time includes thorough review of current medical information, gathering information on past medical history/social history and prior level of function, completion of standardized testing/informal observation of tasks, assessment of data and education on plan of care and goals.     CPT codes:  [x] Low Complexity PT evaluation 02221  [] Moderate Complexity PT evaluation 17147  [] High Complexity PT evaluation 87616  [] PT Re-evaluation 26276  [] Gait training 81848 0 minutes  [] Manual therapy 40586 0 minutes  [] Therapeutic activities 91038 0 minutes  [] Therapeutic exercises 93169 0 minutes  [] Neuromuscular reeducation 46093 0 minutes     Jailyn Easton PT, DPT  [id number]

## 2022-10-11 NOTE — PROGRESS NOTES
[address]        RVZU:                                                  Patient Name: Soco Buitrago    MRN: [id number]    : 1995    Room: [address] [place] [address]      Evaluating OT: Tatianna Egan, [address] OTR/L; [id number]      Referring Provider: Fe Vance DO    Specific Provider Orders/Date: OT Eval and Treat 10/9/22      Diagnosis: Subarachnoid hemorrhage ([facility] [address] [I60.9]  Subdural hematoma [S06. 5XAA]  SDH (subdural hematoma) [S06. 5XAA]  Intraparenchymal hemorrhage of brain ([address] [I61.9]  Laceration of scalp, initial encounter [S01.01XA]  Closed fracture of left side of occipital bone, unspecified occipital fracture type, initial encounter ([facility] [address] [H16.905H]     Surgery/Procedures: none this admission    Pertinent Medical History:  has no past medical history on file.       Reason for Admission: Patient was intoxicated and tripped on asphalt while throwing chairs at a [employer] function and hit head on asphalt, abrasion on posterior scalp    Recommended Adaptive Equipment:  TBD pending progression      Precautions:  Fall Risk, Sitter, Impulsive     Assessment of current deficits:    [x] Functional mobility  [x]ADLs  [x] Strength               [x]Cognition    [x] Functional transfers   [x] IADLs         [x] Safety Awareness   [x]Endurance    [x] Fine Coordination              [x] Balance      [] Vision/perception   []Sensation     []Gross Motor Coordination  [] ROM  [] Delirium                   [] Motor Control     OT PLAN OF CARE   OT POC based on physician orders, patient diagnosis and results of clinical assessment    Frequency/Duration: 1-3 days/wk for 2 weeks PRN   Specific OT Treatment Interventions to include:   * Instruction/training on adapted ADL techniques and AE recommendations to increase functional independence within precautions       * Training on energy conservation strategies, correct breathing pattern and techniques to improve independence/tolerance for self-care routine  * Functional transfer/mobility training/DME recommendations for increased independence, safety, and fall prevention  * Patient/Family education to increase follow through with safety techniques and functional independence  * Recommendation of environmental modifications for increased safety with functional transfers/mobility and ADLs  * Therapeutic exercise to improve motor endurance, ROM, and functional strength for ADLs/functional transfers  * Therapeutic activities to facilitate/challenge dynamic balance, stand tolerance for increased safety and independence with ADLs    Home Living: Pt lives with grandmother in 2 story home with 3 steps and 0HR to enter. Bed located on 2nd floor with full flight of steps and 1HR to access. Bathroom located on 1st floor.    Bathroom setup: tub/shower unit with shower chair   Equipment owned: shower chair    Prior Level of Function: IND with ADLs    IND with IADLs  ambulated with no AD prior  Driving: not stated  Occupation: not stated    Pain Level: 4/10 headache - pt updated on pain medication by RN prior to session  Cognition: A&O: 4/4  Flat affect during session  Follows single step directions - required repeated verbal cues for safety d/t impulsivity    Memory:  good   Sequencing:  fair   Problem solving:  fair   Judgement/safety:  fair     Functional Assessment:  AM-PAC Daily Activity Raw Score: 19/24   Initial Eval Status  Date: 10/11/22 Treatment Status  Date: STGs = LTGs  Time frame: 10-14 days   Feeding IND       Grooming Stand by Assist   Independent    UB Dressing Stand by Assist   Independent    LB Dressing Stand by Assist   Stanislaw socks seated EOB  Independent    Bathing Stand by Assist  Independent    Toileting Stand by Assist   Independent    Bed Mobility  Log Roll: NT  Supine to sit: Supervision   Sit to supine: Supervision   Supine to sit: Independent   Sit to supine: Independent    Functional Transfers Sit to stand:SBA  Stand to sit:SBA  Stand pivot: SBA  Commode: SBA  Sit to stand:IND  Stand to sit:IND  Stand pivot: IND  Commode: IND    Functional Mobility SBA with no AD   within household distance  Verbal cues to slow gait for safety during functional mobility in hallway   IND    Balance Sitting:     Static - SBA     Dynamic - SBA  Standing: SBA with no AD  Sitting:  Static: IND  Dynamic: IND  Standing: IND   Activity Tolerance FAIR    GOOD   Visual/  Perceptual Glasses: none present        Vitals WFL         BUE  ROM/Strength/  Fine motor Coordination Hand dominance: Right     RUE: ROM WFL     Strength: 4/5      Strength: FAIR     Coordination:  FAIR     LUE: ROM WFL     Strength: 4/5      Strength: FAIR     Coordination:  FAIR  increase BUE muscle strength for improved indep with functional transfers       Fine Motor Coordination:  finger to nose assessment appears WFL  Hearing: WFL   Sensation:  No c/o numbness or tingling   Tone: WFL   Edema: Unremarkable    Patient/Family Goal: pt goal is to return home    Comment: Cleared by RN to see pt. Upon arrival patient lying supine in bed with sitter present and agreeable to OT session. At end of session, patient seated EOB with sitter present with call light and phone within reach, all lines and tubes intact. Overall patient demonstrated  decreased independence and safety during completion of ADL/functional transfer/mobility tasks. Pt would benefit from continued skilled OT to increase safety and independence with completion of ADL/IADL tasks for functional independence and quality of life. Treatment: Pt required vc's for proper technique/safety with hand placement/body mechanics/posture for bed mobility/ADLs/functional tranfers/mobility/ww management. Pt required vc's for sequencing/initiation of ADLs/functional transfers.  Pt able to  sit EOB ~5 mins to increase core strength/balance/activity tolerance for ease with ADLs. Pt required verbal cues for safety when completind ADLs/functional transfers due to impulsivity during session. Pt completed donning socks seated EOB and ambulation in hallway with noted quick gait and decreased safety insight. Pt required repeated verbal cues for slowed gait to prevent risk of fall and improve overall independence. Pt returned to supine in bed. Pt required skilled monitoring of SpO2 during session and education on energy conservation techniques. Pt required rest breaks during session and educated on pursed lip breathing. Pt appeared to have tolerated session well and appears motivated/cooperative/pleasant . Pt instructed on use of call light for assistance and fall prevention. Pt demo'ing fair understanding of education provided. Continue to educate. Rehab Potential: Good  for established goals       LTG: maximize independence with ADLs to return to PLOF    Patient and/or family were instructed on functional diagnosis, prognosis/goals and OT plan of care. Demonstrated FAIR understanding. [] Malnutrition indicators have been identified and nursing has been notified to ensure a dietitian consult is ordered. Eval Complexity: Low     Evaluation time includes thorough review of current medical information, gathering information on past medical & social history & PLOF, completion of standardized testing, informal observation of tasks, consultation with other medical professions/disciplines, assessment of data & development of POC/goals.      Time In: 0935  Time Out: 9835  Total Treatment Time: none    Min Units   OT Eval Low 91296  x     OT Eval Medium 52666      OT Eval High 24457      OT Re-Eval [id number]       Therapeutic Ex [address]       Therapeutic Activities 64938       ADL/Self Care 02683       Orthotic Management [id number]       Manual 05838     Neuro Re-Ed [id number]       Non-Billable Time          Evaluation Time additionally includes thorough review of current medical information, gathering information on past medical history/social history and prior level of function, interpretation of standardized testing/informal observation of tasks, assessment of data and development of plan of care and goals.             CHRIS Abraham OTR/L; [id number]

## 2022-10-11 NOTE — PROGRESS NOTES
Pt refused to wait for discharge paperwork and instructions despite this RN's attempt to go over AVS. Pt left with his brother to go home.     Electronically signed by America Holder RN on 10/11/2022 at 11:47 AM

## 2022-10-11 NOTE — PLAN OF CARE
Problem: Pain  Goal: Verbalizes/displays adequate comfort level or baseline comfort level  Outcome: Not Progressing     Problem: Discharge Planning  Goal: Discharge to home or other facility with appropriate resources  Outcome: Progressing  Flowsheets (Taken 10/10/2022 2100)  Discharge to home or other facility with appropriate resources: Identify barriers to discharge with patient and caregiver     Problem: Safety - Adult  Goal: Free from fall injury  Outcome: Progressing  Flowsheets (Taken 10/11/2022 0128)  Free From Fall Injury: Instruct family/caregiver on patient safety     Problem: ABCDS Injury Assessment  Goal: Absence of physical injury  Outcome: Progressing     Problem: Pain  Goal: Verbalizes/displays adequate comfort level or baseline comfort level  Outcome: Not Progressing

## 2022-10-11 NOTE — PROGRESS NOTES
Patient requested a nicotene patch from RN. Patient did not currently have nicotene patch from 0129 on. New order was placed by pharmacy and new patch was applied at 2027. RN entered room to administer medications at 2135 and patient removed the new nicotene patch from his arm and placed it in empty medication cup. RN questioned why patient removed patch after just requesting a new one and patient stated, \"I know, I want to try to sleep. \" RN disposed of nicotene patch appropriately.

## 2022-10-11 NOTE — PLAN OF CARE
Problem: Pain  Goal: Verbalizes/displays adequate comfort level or baseline comfort level  10/11/2022 0609 by Sam Larios RN  Outcome: Not Progressing     Problem: Pain  Goal: Verbalizes/displays adequate comfort level or baseline comfort level  10/11/2022 0609 by Sam Larios RN  Outcome: Not Progressing

## 2022-10-12 NOTE — PROGRESS NOTES
CLINICAL PHARMACY NOTE: MEDS TO BEDS    Total # of Prescriptions Filled: 3   The following medications were delivered to the patient:  Oxycodone 5  Butalbital-apap-caf -40  Levetiracetam 500    Additional Documentation:

## 2022-10-17 ENCOUNTER — TELEPHONE (OUTPATIENT)
Dept: SURGERY | Age: 27
End: 2022-10-17

## 2022-10-17 NOTE — TELEPHONE ENCOUNTER
LPN took call from patient mother, stating patient would like to move up appointment due to being in pain. Requested we move appointment to tomorrow 10/18.    Electronically signed by Natalio Monroe LPN on 36/27/98 at 0:31 PM EDT

## 2022-10-21 ENCOUNTER — OFFICE VISIT (OUTPATIENT)
Dept: FAMILY MEDICINE CLINIC | Age: 27
End: 2022-10-21
Payer: COMMERCIAL

## 2022-10-21 VITALS
HEART RATE: 63 BPM | RESPIRATION RATE: 18 BRPM | WEIGHT: 142.4 LBS | DIASTOLIC BLOOD PRESSURE: 74 MMHG | BODY MASS INDEX: 19.29 KG/M2 | HEIGHT: 72 IN | SYSTOLIC BLOOD PRESSURE: 115 MMHG | TEMPERATURE: 97.8 F | OXYGEN SATURATION: 99 %

## 2022-10-21 DIAGNOSIS — S06.5XAA SDH (SUBDURAL HEMATOMA): ICD-10-CM
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
## 2022-10-21 DIAGNOSIS — I60.9 SAH (SUBARACHNOID HEMORRHAGE) (HCC): ICD-10-CM

## 2022-10-21 DIAGNOSIS — Z76.89 ENCOUNTER TO ESTABLISH CARE: Primary | ICD-10-CM

## 2022-10-21 DIAGNOSIS — S06.9X9S TRAUMATIC BRAIN INJURY WITH LOSS OF CONSCIOUSNESS, SEQUELA (HCC): ICD-10-CM
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
## 2022-10-21 PROBLEM — S06.9X9A TRAUMATIC BRAIN INJURY WITH LOSS OF CONSCIOUSNESS (HCC): Status: ACTIVE | Noted: 2022-10-21

## 2022-10-21 PROCEDURE — 1111F DSCHRG MED/CURRENT MED MERGE: CPT | Performed by: NEUROMUSCULOSKELETAL MEDICINE & OMM

## 2022-10-21 PROCEDURE — 4004F PT TOBACCO SCREEN RCVD TLK: CPT | Performed by: NEUROMUSCULOSKELETAL MEDICINE & OMM

## 2022-10-21 PROCEDURE — G8420 CALC BMI NORM PARAMETERS: HCPCS | Performed by: NEUROMUSCULOSKELETAL MEDICINE & OMM

## 2022-10-21 PROCEDURE — G8484 FLU IMMUNIZE NO ADMIN: HCPCS | Performed by: NEUROMUSCULOSKELETAL MEDICINE & OMM

## 2022-10-21 PROCEDURE — 99204 OFFICE O/P NEW MOD 45 MIN: CPT | Performed by: NEUROMUSCULOSKELETAL MEDICINE & OMM

## 2022-10-21 PROCEDURE — G8427 DOCREV CUR MEDS BY ELIG CLIN: HCPCS | Performed by: NEUROMUSCULOSKELETAL MEDICINE & OMM

## 2022-10-21 RX ORDER — LEVETIRACETAM 500 MG/1
500 TABLET ORAL 2 TIMES DAILY
Qty: 60 TABLET | Refills: 2 | Status: SHIPPED
Start: 2022-10-21 | End: 2022-11-01 | Stop reason: ALTCHOICE

## 2022-10-21 SDOH — SOCIAL STABILITY: SOCIAL NETWORK: HOW OFTEN DO YOU ATTENT MEETINGS OF THE CLUB OR ORGANIZATION YOU BELONG TO?: NEVER

## 2022-10-21 SDOH — ECONOMIC STABILITY: HOUSING INSECURITY
IN THE LAST 12 MONTHS, WAS THERE A TIME WHEN YOU DID NOT HAVE A STEADY PLACE TO SLEEP OR SLEPT IN A SHELTER (INCLUDING NOW)?: NO

## 2022-10-21 SDOH — HEALTH STABILITY: MENTAL HEALTH: HOW OFTEN DO YOU HAVE A DRINK CONTAINING ALCOHOL?: 2-3 TIMES A WEEK

## 2022-10-21 SDOH — HEALTH STABILITY: PHYSICAL HEALTH

## 2022-10-21 SDOH — ECONOMIC STABILITY: FOOD INSECURITY: WITHIN THE PAST 12 MONTHS, YOU WORRIED THAT YOUR FOOD WOULD RUN OUT BEFORE YOU GOT MONEY TO BUY MORE.: NEVER TRUE

## 2022-10-21 SDOH — SOCIAL STABILITY: SOCIAL INSECURITY
WITHIN THE LAST YEAR, HAVE YOU BEEN KICKED, HIT, SLAPPED, OR OTHERWISE PHYSICALLY HURT BY YOUR PARTNER OR EX-PARTNER?: NO

## 2022-10-21 SDOH — HEALTH STABILITY: MENTAL HEALTH: HOW MANY STANDARD DRINKS CONTAINING ALCOHOL DO YOU HAVE ON A TYPICAL DAY?: 7 TO 9

## 2022-10-21 SDOH — ECONOMIC STABILITY: INCOME INSECURITY: IN THE LAST 12 MONTHS, WAS THERE A TIME WHEN YOU WERE NOT ABLE TO PAY THE MORTGAGE OR RENT ON TIME?: NO

## 2022-10-21 SDOH — SOCIAL STABILITY: SOCIAL NETWORK
DO YOU BELONG TO ANY CLUBS OR ORGANIZATIONS SUCH AS CHURCH GROUPS UNIONS, FRATERNAL OR ATHLETIC GROUPS, OR SCHOOL GROUPS?: NO

## 2022-10-21 SDOH — ECONOMIC STABILITY: FOOD INSECURITY: WITHIN THE PAST 12 MONTHS, THE FOOD YOU BOUGHT JUST DIDN'T LAST AND YOU DIDN'T HAVE MONEY TO GET MORE.: NEVER TRUE

## 2022-10-21 SDOH — SOCIAL STABILITY: SOCIAL INSECURITY
WITHIN THE LAST YEAR, HAVE TO BEEN RAPED OR FORCED TO HAVE ANY KIND OF SEXUAL ACTIVITY BY YOUR PARTNER OR EX-PARTNER?: NO

## 2022-10-21 SDOH — SOCIAL STABILITY: SOCIAL INSECURITY: WITHIN THE LAST YEAR, HAVE YOU BEEN HUMILIATED OR EMOTIONALLY ABUSED IN OTHER WAYS BY YOUR PARTNER OR EX-PARTNER?: NO

## 2022-10-21 SDOH — ECONOMIC STABILITY: TRANSPORTATION INSECURITY
IN THE PAST 12 MONTHS, HAS THE LACK OF TRANSPORTATION KEPT YOU FROM MEDICAL APPOINTMENTS OR FROM GETTING MEDICATIONS?: NO

## 2022-10-21 SDOH — SOCIAL STABILITY: SOCIAL NETWORK: HOW OFTEN DO YOU ATTEND CHURCH OR RELIGIOUS SERVICES?: MORE THAN 4 TIMES PER YEAR

## 2022-10-21 SDOH — ECONOMIC STABILITY: TRANSPORTATION INSECURITY
IN THE PAST 12 MONTHS, HAS LACK OF TRANSPORTATION KEPT YOU FROM MEETINGS, WORK, OR FROM GETTING THINGS NEEDED FOR DAILY LIVING?: NO

## 2022-10-21 SDOH — SOCIAL STABILITY: SOCIAL NETWORK: HOW OFTEN DO YOU GET TOGETHER WITH FRIENDS OR RELATIVES?: ONCE A WEEK

## 2022-10-21 SDOH — HEALTH STABILITY: MENTAL HEALTH
STRESS IS WHEN SOMEONE FEELS TENSE, NERVOUS, ANXIOUS, OR CAN'T SLEEP AT NIGHT BECAUSE THEIR MIND IS TROUBLED. HOW STRESSED ARE YOU?: RATHER MUCH

## 2022-10-21 SDOH — SOCIAL STABILITY: SOCIAL INSECURITY: WITHIN THE LAST YEAR, HAVE YOU BEEN AFRAID OF YOUR PARTNER OR EX-PARTNER?: NO

## 2022-10-21 SDOH — SOCIAL STABILITY: SOCIAL NETWORK: ARE YOU MARRIED, WIDOWED, DIVORCED, SEPARATED, NEVER MARRIED, OR LIVING WITH A PARTNER?: NEVER MARRIED

## 2022-10-21 SDOH — SOCIAL STABILITY: SOCIAL NETWORK
IN A TYPICAL WEEK, HOW MANY TIMES DO YOU TALK ON THE PHONE WITH FAMILY, FRIENDS, OR NEIGHBORS?: MORE THAN THREE TIMES A WEEK

## 2022-10-21 SDOH — ECONOMIC STABILITY: HOUSING INSECURITY: IN THE LAST 12 MONTHS, HOW MANY PLACES HAVE YOU LIVED?: 1

## 2022-10-21 ASSESSMENT — PATIENT HEALTH QUESTIONNAIRE - PHQ9
3. TROUBLE FALLING OR STAYING ASLEEP: 3
1. LITTLE INTEREST OR PLEASURE IN DOING THINGS: 3
10. IF YOU CHECKED OFF ANY PROBLEMS, HOW DIFFICULT HAVE THESE PROBLEMS MADE IT FOR YOU TO DO YOUR WORK, TAKE CARE OF THINGS AT HOME, OR GET ALONG WITH OTHER PEOPLE: 3
SUM OF ALL RESPONSES TO PHQ QUESTIONS 1-9: 24
SUM OF ALL RESPONSES TO PHQ QUESTIONS 1-9: 21
2. FEELING DOWN, DEPRESSED OR HOPELESS: 3
SUM OF ALL RESPONSES TO PHQ9 QUESTIONS 1 & 2: 6
7. TROUBLE CONCENTRATING ON THINGS, SUCH AS READING THE NEWSPAPER OR WATCHING TELEVISION: 3
SUM OF ALL RESPONSES TO PHQ QUESTIONS 1-9: 24
4. FEELING TIRED OR HAVING LITTLE ENERGY: 3
5. POOR APPETITE OR OVEREATING: 3
9. THOUGHTS THAT YOU WOULD BE BETTER OFF DEAD, OR OF HURTING YOURSELF: 3
6. FEELING BAD ABOUT YOURSELF - OR THAT YOU ARE A FAILURE OR HAVE LET YOURSELF OR YOUR FAMILY DOWN: 0
8. MOVING OR SPEAKING SO SLOWLY THAT OTHER PEOPLE COULD HAVE NOTICED. OR THE OPPOSITE, BEING SO FIGETY OR RESTLESS THAT YOU HAVE BEEN MOVING AROUND A LOT MORE THAN USUAL: 3
SUM OF ALL RESPONSES TO PHQ QUESTIONS 1-9: 24

## 2022-10-21 ASSESSMENT — ENCOUNTER SYMPTOMS
CONSTIPATION: 0
NAUSEA: 1
SHORTNESS OF BREATH: 0
RECTAL PAIN: 0
DIARRHEA: 0
COUGH: 0
TROUBLE SWALLOWING: 0
WHEEZING: 0
VOMITING: 1
CHEST TIGHTNESS: 0
CHOKING: 0
STRIDOR: 0

## 2022-10-21 ASSESSMENT — LIFESTYLE VARIABLES
HOW OFTEN DO YOU HAVE A DRINK CONTAINING ALCOHOL: 2-3 TIMES A WEEK
HOW MANY STANDARD DRINKS CONTAINING ALCOHOL DO YOU HAVE ON A TYPICAL DAY: 7 TO 9

## 2022-10-21 ASSESSMENT — SOCIAL DETERMINANTS OF HEALTH (SDOH): HOW HARD IS IT FOR YOU TO PAY FOR THE VERY BASICS LIKE FOOD, HOUSING, MEDICAL CARE, AND HEATING?: NOT HARD AT ALL

## 2022-10-21 NOTE — ASSESSMENT & PLAN NOTE
I did place him and continue him on Keppra 500 mg 1 by mouth twice a day for seizure prophylaxis. He will need to follow-up closely with Ortho trauma clinic in regards to serial CAT scans for resolution of his subdural hematoma and or subarachnoid hemorrhage. Also need to be checked for and followed for his left-sided occipital skull fracture.

## 2022-10-21 NOTE — ASSESSMENT & PLAN NOTE
Patient to see and follow orthopedics/trauma clinic. If signs and symptoms persist of headache nausea vomiting visual disturbances he was directed to go to the emergency room immediately. Patient was noncompliant with his follow-up at trauma clinic on October 18, 2022.

## 2022-10-21 NOTE — PROGRESS NOTES
Darrius Solis (:  1995) is a 32 y.o. male,New patient, here for evaluation of the following chief complaint(s):  Ivanna Avila Doctor, ED Follow-up (Had brain bleed from fall and hit head on 10/8/22//), Headache, and Nausea         ASSESSMENT/PLAN:  1. Encounter to establish care  2. Fall, sequela  3. Traumatic brain injury with loss of consciousness, sequela [facility])  Assessment & Plan:  Patient with a traumatic brain injury and an unknown loss of consciousness likely a short period of time. He does have some amnesia relating to the time shortly after his fall. Recall going to the hospital and the first 2 or 3 days while he was in the hospital.  Orders:  -     levETIRAcetam (KEPPRA) 500 MG tablet; Take 1 tablet by mouth 2 times daily for 5 days, Disp-60 tablet, R-2Normal  4. SAH (subarachnoid hemorrhage) ([facility] [address]  Assessment & Plan:   Patient to see and follow orthopedics/trauma clinic. If signs and symptoms persist of headache nausea vomiting visual disturbances he was directed to go to the emergency room immediately. Patient was noncompliant with his follow-up at trauma clinic on 2022.  5. SDH (subdural hematoma)  Assessment & Plan:   I did place him and continue him on Keppra 500 mg 1 by mouth twice a day for seizure prophylaxis. He will need to follow-up closely with Ortho trauma clinic in regards to serial CAT scans for resolution of his subdural hematoma and or subarachnoid hemorrhage. Also need to be checked for and followed for his left-sided occipital skull fracture. Return in about 2 weeks (around 2022). Subjective   SUBJECTIVE/OBJECTIVE:  HPI 24-year-old male here to establish care. He is also hospital follow-up patient had a fall across the street from his [employer] patient did not remember what happened he woke up in the hospital 2 to 3 days later. His UDS did show positive for fentanyl but he said he took this [place] supplement.   Patient states that he has a history of marijuana use in the past but no history of IV drug use. He is here today with his grandmother. He suffered a subdural hematoma and a subarachnoid hemorrhage as well as a skull fracture occipital left-sided. Patient was discharged and placed on Keppra 500 mg 1 by mouth twice a day for his mild traumatic brain injury for seizure prevention. He states that he is done taking his Keppra I wrote him another prescription until he can be seen at the trauma clinic. Patient left the trauma clinic on October 18, 2022 without being seen. I adamantly is and strongly told that he needed to follow-up with them. We will call trauma clinic to get him another appointment. Patient states she did call her and apologize and is waiting for a call back for return visit. Patient states he has been having pretty much daily headaches with some nausea and vomiting. The other day he vomited for about 12 straight hours. I told him if symptoms worsened he is to go to the emergency room right away until we can get him seen at the trauma clinic. There was no surgical intervention during his hospital stay. His initial CAT scan of his head without contrast on October 8, 2022 showed a left occipital bone fracture extending through the occipital condyle and into the left aspect of the foramen magnum. Posterior scalp contusion noted. Also left frontal lobe and to a lesser extent medial right frontal lobe subdural hematoma extending into the anterior falx. There are also areas of subarachnoid hemorrhage involving the left frontal lobe, left temporal lobe, and medial right femoral lobe with parenchymal contusions in these regions. No evidence of midline shift or herniation at this time. No acute osseous findings of the cervical spine. No definitive evidence of posterior fossa hemorrhage. He did have a repeat CAT scan the next day on October 9, 2022.   That showed no significant change in the trace subdural hematoma along the left frontal convexity and interhemispheric fissure. No significant interval change in trace subarachnoid blood products identified. And no significant change in small parenchymal contusion in the left frontal and left temporal lobes. She was seen by neurosurgery and was admitted under the trauma service. He was admitted on October 8, 2022 and discharged October 11, 2022 Dr. King Flores saw him for the subarachnoid hemorrhage, subdural hematoma, and intraparenchymal hemorrhage of the brain. He also had a closed fracture of the left side of the occipital bone. We will reach out to the trauma clinic for close follow-up. He is to see me in 2 weeks. I did write him a prescription for Keppra 500 mg 1 by mouth twice a day I dispense 60 with 2 refills. Review of Systems   Constitutional:  Positive for appetite change (\"bad\" appetite, vomiting yesterday for 12 hours after taking his medication for his \"head pain\". ). Negative for activity change, chills, diaphoresis, fatigue, fever and unexpected weight change. HENT:  Negative for trouble swallowing. Eyes:  Negative for visual disturbance. Respiratory:  Negative for cough, choking, chest tightness, shortness of breath, wheezing and stridor. Cardiovascular:  Negative for chest pain, palpitations and leg swelling. Gastrointestinal:  Positive for nausea and vomiting (over last 2 days with nausea as well. ). Negative for constipation, diarrhea and rectal pain. Genitourinary:  Negative for difficulty urinating, dysuria and enuresis. Musculoskeletal:  Positive for neck pain (the two tendons in the back of his neck, which goes into the left eye/temple area. ). Negative for neck stiffness. Skin:  Negative for rash.    Neurological:  Positive for dizziness (when sitting for a long time as he gets up he becomes dizzy and has to sit back down.), light-headedness (when he stands up too fast, but not common.) and headaches (if he takes his \"headache\" medication it is not too bad.). Negative for tremors, syncope, speech difficulty and weakness. Hematological:  Does not bruise/bleed easily. Psychiatric/Behavioral:  Negative for agitation and self-injury. The patient is nervous/anxious (history of anxious/depression at age 21. He did attend counseling at that time with no resolve. ). Objective   /74 (Site: Right Upper Arm, Position: Sitting, Cuff Size: Large Adult)   Pulse 63   Temp 97.8 °F (36.6 °C) (Temporal)   Resp 18   Ht 6' (1.829 m)   Wt 142 lb 6.4 oz (64.6 kg)   SpO2 99%   BMI 19.31 kg/m²     Physical Exam  Constitutional:       General: He is not in acute distress. Appearance: He is not ill-appearing or diaphoretic. Eyes:      General: No scleral icterus. Right eye: No discharge. Left eye: No discharge. Extraocular Movements: Extraocular movements intact. Conjunctiva/sclera: Conjunctivae normal.      Pupils: Pupils are equal, round, and reactive to light. Cardiovascular:      Rate and Rhythm: Normal rate and regular rhythm. Pulses: Normal pulses. Heart sounds: Normal heart sounds. No murmur heard. No friction rub. No gallop. Pulmonary:      Effort: Pulmonary effort is normal. No respiratory distress. Breath sounds: Normal breath sounds. No stridor. No wheezing, rhonchi or rales. Musculoskeletal:      Right lower leg: No edema. Left lower leg: No edema. Lymphadenopathy:      Cervical: No cervical adenopathy. Neurological:      General: No focal deficit present. Mental Status: He is oriented to person, place, and time. Psychiatric:         Mood and Affect: Mood normal.         Behavior: Behavior normal.           History reviewed. No pertinent past medical history. History reviewed. No pertinent surgical history. The ASCVD Risk score ([place] DK, et al., 2019) failed to calculate for the following reasons:     The 2019 ASCVD risk score is only valid for ages 36 to 78 Educational materials and/or home exercises printed for patient's review and were included inpatient instructions on his/her After Visit Summary and given to patient at the end of visit.     regarding above diagnosis, including possible risks and complications,  especially if left uncontrolled. Counseled regarding the possible side effects, risks, benefits and alternatives to treatment; patient and/or guardian verbalizes understanding, agrees, feels comfortable with and wishes to proceed withabove treatment plan. Advised patient to call with any new medication issues, and read all Rx infofrom pharmacy to assure aware of all possible risks and side effects of medication before taking. age and gender appropriate health screening exams and vaccinations. Advised patient regarding importance of keeping up with recommended health maintenance and to schedule as soon as possible if overdue, as this isimportant in assessing for undiagnosed pathology, especially cancer, as well as protecting against potentially harmful/life threatening disease. Patient and/or guardian verbalizes understanding andagrees with above counseling, assessment and plan. All questions answered. An electronic signature was used to authenticate this note.     --[place] Cancel, DO

## 2022-10-21 NOTE — ASSESSMENT & PLAN NOTE
Patient with a traumatic brain injury and an unknown loss of consciousness likely a short period of time. He does have some amnesia relating to the time shortly after his fall.   Recall going to the hospital and the first 2 or 3 days while he was in the hospital.

## 2022-10-25 ENCOUNTER — OFFICE VISIT (OUTPATIENT)
Dept: SURGERY | Age: 27
End: 2022-10-25
Payer: COMMERCIAL

## 2022-10-25 VITALS
SYSTOLIC BLOOD PRESSURE: 118 MMHG | OXYGEN SATURATION: 100 % | TEMPERATURE: 97.4 F | BODY MASS INDEX: 19.23 KG/M2 | HEART RATE: 80 BPM | WEIGHT: 142 LBS | DIASTOLIC BLOOD PRESSURE: 76 MMHG | RESPIRATION RATE: 15 BRPM | HEIGHT: 72 IN
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
## 2022-10-25 PROCEDURE — G8420 CALC BMI NORM PARAMETERS: HCPCS | Performed by: NURSE PRACTITIONER

## 2022-10-25 PROCEDURE — 99214 OFFICE O/P EST MOD 30 MIN: CPT | Performed by: NURSE PRACTITIONER

## 2022-10-25 PROCEDURE — 4004F PT TOBACCO SCREEN RCVD TLK: CPT | Performed by: NURSE PRACTITIONER

## 2022-10-25 PROCEDURE — G8484 FLU IMMUNIZE NO ADMIN: HCPCS | Performed by: NURSE PRACTITIONER

## 2022-10-25 PROCEDURE — 1111F DSCHRG MED/CURRENT MED MERGE: CPT | Performed by: NURSE PRACTITIONER

## 2022-10-25 PROCEDURE — G8428 CUR MEDS NOT DOCUMENT: HCPCS | Performed by: NURSE PRACTITIONER

## 2022-10-25 PROCEDURE — 99212 OFFICE O/P EST SF 10 MIN: CPT | Performed by: NURSE PRACTITIONER

## 2022-10-25 RX ORDER — NAPROXEN SODIUM 220 MG
220 TABLET ORAL 2 TIMES DAILY WITH MEALS
COMMUNITY

## 2022-10-25 RX ORDER — METHYLPREDNISOLONE 4 MG/1
TABLET ORAL
Qty: 1 KIT | Refills: 0 | Status: SHIPPED | OUTPATIENT
Start: 2022-10-25 | End: 2022-10-25 | Stop reason: SDUPTHER

## 2022-10-25 RX ORDER — METHYLPREDNISOLONE 4 MG/1
TABLET ORAL
Qty: 1 KIT | Refills: 0 | Status: SHIPPED | OUTPATIENT
Start: 2022-10-25 | End: 2022-10-31

## 2022-10-25 RX ORDER — GABAPENTIN 100 MG/1
100 CAPSULE ORAL 3 TIMES DAILY
Qty: 21 CAPSULE | Refills: 0 | Status: SHIPPED
Start: 2022-10-25 | End: 2022-11-01 | Stop reason: SDUPTHER

## 2022-10-26 NOTE — PROGRESS NOTES
Trauma Clinic Progress Note   10/26/2022     Date of injury: 10/8         PHI/Injuries:   Patient Active Problem List   Diagnosis Code    SDH (subdural hematoma) S06. 5XAA    [facility]) [facility] I60.9    Fall W19. XXXA    Traumatic brain injury with loss of consciousness ([facility] S06. 9X9A       Surgeries:   No past surgical history on file. Vital signs:    /76   Pulse 80   Temp 97.4 °F (36.3 °C) (Temporal)   Resp 15   Ht 6' (1.829 m)   Wt 142 lb (64.4 kg)   SpO2 100%   BMI 19.26 kg/m²     Medications:    Prior to Admission medications    Medication Sig Start Date End Date Taking? Authorizing Provider   naproxen sodium (ALEVE) 220 MG tablet Take 220 mg by mouth 2 times daily (with meals)   Yes Historical Provider, MD   gabapentin (NEURONTIN) 100 MG capsule Take 1 capsule by mouth 3 times daily for 7 days. Intended supply: 90 days 10/25/22 11/1/22 Yes MYLA Berry CNP   methylPREDNISolone (MEDROL, GIORGI,) 4 MG tablet Take by mouth. 10/25/22 10/31/22 Yes MYLA Berry CNP   levETIRAcetam (KEPPRA) 500 MG tablet Take 1 tablet by mouth 2 times daily for 5 days 10/21/22 10/26/22 Yes Homer Solis,    butalbital-acetaminophen-caffeine (FIORICET, ESGIC) -25 MG per tablet Take 1 tablet by mouth every 4 hours as needed for Headaches 10/11/22 10/25/22 Yes Romero Vega APRN - [place]          CC:  Trauma follow up Fall, accompanied by grandmother in which he is living with, permission to speak freely    Patient presents to the clinic today for follow up on fall in which he sustained basilar skull fx, SAH, SDH, SDH. Pt states that he is having ongoing continuous HA, mood swings, and short tempered. Sleep has been poor and his appetite is poor. Both pt and grandmother state that this is not how he usually acts. Fioricet helps somewhat with HA. Headache yes  Dizziness. /Off balance no  Nausea/vomiting intermitent  Forgetful or poor memory yes  Poor concentration or in a fog yes  Vision changes:  Blurred no. Double no. Light sensitivity no. Changes in sleep or more fatigued yes      Physical Exam   Physical Exam  HENT:      Head: Normocephalic. Cardiovascular:      Rate and Rhythm: Normal rate and regular rhythm. Heart sounds: Normal heart sounds. Pulmonary:      Effort: Pulmonary effort is normal. No respiratory distress. Abdominal:      Palpations: Abdomen is soft. Musculoskeletal:         General: Normal range of motion. Cervical back: Normal range of motion. Skin:     General: Skin is warm and dry. Neurological:      General: No focal deficit present. Mental Status: He is alert and oriented to person, place, and time. Education  Instructed to increase activity. Instructed on concussion:  causes, definition, s&s, treatment, course of concussion. Assessment  Patient Active Problem List   Diagnosis Code    SDH (subdural hematoma) S06. 5XAA    SAH (subarachnoid hemorrhage) ([facility] I60.9    Fall W19. XXXA    Traumatic brain injury with loss of consciousness ([facility] S06. 9X9A     At length conversation had with pt and family in regards to TBI/concussion. We will trial him on a short corse of steroids and Neurontin. Keppra not needed this far out so we will DC. We will start some cognitive activities as well and ensure Neurosurgery follow up.     Plan  RTC 1-2 weeks      Future Appointments   Date Time Provider Josafat Montenegro   11/1/2022  1:00 PM SCHEDULE, SE TRAUMA SE Trauma [facility]   11/4/2022  9:30 AM DO Irais Russell [facility]     Total Time: 35 minutes

## 2022-11-01 ENCOUNTER — OFFICE VISIT (OUTPATIENT)
Dept: SURGERY | Age: 27
End: 2022-11-01
Payer: COMMERCIAL

## 2022-11-01 VITALS
BODY MASS INDEX: 19.64 KG/M2 | SYSTOLIC BLOOD PRESSURE: 139 MMHG | TEMPERATURE: 97.9 F | HEIGHT: 72 IN | WEIGHT: 145 LBS | HEART RATE: 80 BPM | DIASTOLIC BLOOD PRESSURE: 89 MMHG

## 2022-11-01 DIAGNOSIS — S06.9X9A TRAUMATIC BRAIN INJURY WITH LOSS OF CONSCIOUSNESS, INITIAL ENCOUNTER (HCC): Primary | ICD-10-CM

## 2022-11-01 PROCEDURE — 99212 OFFICE O/P EST SF 10 MIN: CPT | Performed by: CLINICAL NURSE SPECIALIST

## 2022-11-01 PROCEDURE — G8484 FLU IMMUNIZE NO ADMIN: HCPCS | Performed by: CLINICAL NURSE SPECIALIST

## 2022-11-01 PROCEDURE — G8427 DOCREV CUR MEDS BY ELIG CLIN: HCPCS | Performed by: CLINICAL NURSE SPECIALIST

## 2022-11-01 PROCEDURE — 1111F DSCHRG MED/CURRENT MED MERGE: CPT | Performed by: CLINICAL NURSE SPECIALIST

## 2022-11-01 PROCEDURE — G8420 CALC BMI NORM PARAMETERS: HCPCS | Performed by: CLINICAL NURSE SPECIALIST

## 2022-11-01 PROCEDURE — 4004F PT TOBACCO SCREEN RCVD TLK: CPT | Performed by: CLINICAL NURSE SPECIALIST

## 2022-11-01 PROCEDURE — 99213 OFFICE O/P EST LOW 20 MIN: CPT | Performed by: CLINICAL NURSE SPECIALIST

## 2022-11-01 RX ORDER — BUTALBITAL, ACETAMINOPHEN AND CAFFEINE 50; 325; 40 MG/1; MG/1; MG/1
1 TABLET ORAL EVERY 4 HOURS PRN
Qty: 84 TABLET | Refills: 0 | Status: SHIPPED | OUTPATIENT
Start: 2022-11-01 | End: 2022-11-15

## 2022-11-01 RX ORDER — GABAPENTIN 100 MG/1
100 CAPSULE ORAL 3 TIMES DAILY
Qty: 90 CAPSULE | Refills: 0 | Status: SHIPPED | OUTPATIENT
Start: 2022-11-01 | End: 2022-12-01
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
## 2022-11-04 ENCOUNTER — OFFICE VISIT (OUTPATIENT)
Dept: FAMILY MEDICINE CLINIC | Age: 27
End: 2022-11-04
Payer: COMMERCIAL

## 2022-11-04 VITALS
DIASTOLIC BLOOD PRESSURE: 89 MMHG | WEIGHT: 138.2 LBS | BODY MASS INDEX: 18.72 KG/M2 | HEART RATE: 116 BPM | RESPIRATION RATE: 20 BRPM | SYSTOLIC BLOOD PRESSURE: 128 MMHG | HEIGHT: 72 IN | TEMPERATURE: 98 F | OXYGEN SATURATION: 98 %

## 2022-11-04 DIAGNOSIS — S06.5XAA SDH (SUBDURAL HEMATOMA): ICD-10-CM

## 2022-11-04 DIAGNOSIS — S02.109S CLOSED FRACTURE OF BASE OF SKULL, UNSPECIFIED LATERALITY, SEQUELA (HCC): ICD-10-CM

## 2022-11-04 DIAGNOSIS — S06.9X9S TRAUMATIC BRAIN INJURY WITH LOSS OF CONSCIOUSNESS, SEQUELA (HCC): Primary | ICD-10-CM

## 2022-11-04 DIAGNOSIS — I60.9 SAH (SUBARACHNOID HEMORRHAGE) (HCC): ICD-10-CM

## 2022-11-04 PROBLEM — S02.109A CLOSED SKULL BASE FX (HCC): Status: ACTIVE | Noted: 2022-11-04

## 2022-11-04 PROCEDURE — 1111F DSCHRG MED/CURRENT MED MERGE: CPT | Performed by: NEUROMUSCULOSKELETAL MEDICINE & OMM

## 2022-11-04 PROCEDURE — 99213 OFFICE O/P EST LOW 20 MIN: CPT | Performed by: NEUROMUSCULOSKELETAL MEDICINE & OMM

## 2022-11-04 PROCEDURE — G8484 FLU IMMUNIZE NO ADMIN: HCPCS | Performed by: NEUROMUSCULOSKELETAL MEDICINE & OMM

## 2022-11-04 PROCEDURE — G8427 DOCREV CUR MEDS BY ELIG CLIN: HCPCS | Performed by: NEUROMUSCULOSKELETAL MEDICINE & OMM

## 2022-11-04 PROCEDURE — 4004F PT TOBACCO SCREEN RCVD TLK: CPT | Performed by: NEUROMUSCULOSKELETAL MEDICINE & OMM

## 2022-11-04 PROCEDURE — G8420 CALC BMI NORM PARAMETERS: HCPCS | Performed by: NEUROMUSCULOSKELETAL MEDICINE & OMM

## 2022-11-04 ASSESSMENT — ENCOUNTER SYMPTOMS
CHOKING: 0
SHORTNESS OF BREATH: 0
CHEST TIGHTNESS: 0
COUGH: 0

## 2022-11-04 NOTE — PROGRESS NOTES
Juanda Fothergill (:  1995) is a 32 y.o. male,Established patient, here for evaluation of the following chief complaint(s):  Head Injury (Follow up/)         ASSESSMENT/PLAN:  1. Traumatic brain injury with loss of consciousness, sequela ([address]  Assessment & Plan:  No residuals. Neurologically stable. 2. SAH (subarachnoid hemorrhage) (HCC)  Assessment & Plan:  No residual effects from subarachnoid hemorrhage. Follow-up CAT scans per recommendations of trauma surgeon/clinic. 3. SDH (subdural hematoma)  Assessment & Plan:   Follow-up on CAT scans per trauma service. I know the patient told me he is waiting a referral to a neurologist which was done through the trauma clinic. He does not have a date or time yet for that initial neurology consult. 4. Closed fracture of base of skull, unspecified laterality, sequela (HCC)  Assessment & Plan:  No residual effects noted from a basilar skull fracture. Balance is intact. Speech is intact. No behavioral disturbances. Patient is off his Keppra. He continues on Neurontin 100 mg 1 by mouth 3 times a day per trauma service. Return in about 6 months (around 2023). Subjective   SUBJECTIVE/OBJECTIVE:  HPI 64-year-old male here for follow-up on traumatic brain injury, subarachnoid hemorrhage, subdural hematoma, and basilar skull fracture. Incident occurred on 2022. He was seen at the trauma clinic on 2022 he was doing a lot better he has had significant decrease in headache nausea vomiting and his behavior and mood is much better. Headaches still has some in the evenings but not nearly as bad. Patient was taking Fioricet per trauma clinic. Also, he had been on Keppra 500 mg twice a day which was stopped by the trauma clinic as well. He no longer is having difficulties with eating and/or sleeping. He is scheduled to start a job at a [employer] on 2022.   He denies any nausea vomiting, dizziness or unsteady gait. His memory is intact no vision changes, and no changes in sleep or increasing fatigue. Patient does remain on Neurontin 100 mg I believe 3 times a day per trauma clinic. Review of Systems   Constitutional:  Negative for activity change, appetite change, chills, diaphoresis, fatigue and fever. Respiratory:  Negative for cough, choking, chest tightness and shortness of breath. Cardiovascular:  Negative for chest pain, palpitations and leg swelling. Genitourinary:  Negative for difficulty urinating. Neurological:  Negative for dizziness, seizures, syncope, speech difficulty, weakness, light-headedness, numbness and headaches. Objective   /89 (Site: Right Upper Arm, Position: Sitting, Cuff Size: Medium Adult)   Pulse (!) 116   Temp 98 °F (36.7 °C) (Temporal)   Resp 20   Ht 6' (1.829 m)   Wt 138 lb 3.2 oz (62.7 kg)   SpO2 98%   BMI 18.74 kg/m²     Physical Exam  Vitals and nursing note reviewed. Constitutional:       General: He is not in acute distress. Appearance: Normal appearance. He is normal weight. He is not ill-appearing or diaphoretic. HENT:      Head: Normocephalic and atraumatic. Eyes:      General: No scleral icterus. Right eye: No discharge. Left eye: No discharge. Conjunctiva/sclera: Conjunctivae normal.   Cardiovascular:      Rate and Rhythm: Normal rate and regular rhythm. Pulses: Normal pulses. Heart sounds: Normal heart sounds. No murmur heard. No friction rub. No gallop. Pulmonary:      Effort: Pulmonary effort is normal. No respiratory distress. Breath sounds: Normal breath sounds. No stridor. No wheezing, rhonchi or rales. Musculoskeletal:      Right lower leg: No edema. Left lower leg: No edema. Lymphadenopathy:      Cervical: No cervical adenopathy. Skin:     General: Skin is warm and dry. Neurological:      General: No focal deficit present.       Mental Status: He is alert and oriented to person, place, and time. Mental status is at baseline. Psychiatric:         Mood and Affect: Mood normal.         Behavior: Behavior normal.         Thought Content: Thought content normal.         Judgment: Judgment normal.           History reviewed. No pertinent past medical history. History reviewed. No pertinent surgical history. The ASCVD Risk score (Bhavani ARMIRES, et al., 2019) failed to calculate for the following reasons: The 2019 ASCVD risk score is only valid for ages 36 to 78     Educational materials and/or home exercises printed for patient's review and were included inpatient instructions on his/her After Visit Summary and given to patient at the end of visit.     regarding above diagnosis, including possible risks and complications,  especially if left uncontrolled. Counseled regarding the possible side effects, risks, benefits and alternatives to treatment; patient and/or guardian verbalizes understanding, agrees, feels comfortable with and wishes to proceed withabove treatment plan. Advised patient to call with any new medication issues, and read all Rx infofrom pharmacy to assure aware of all possible risks and side effects of medication before taking. age and gender appropriate health screening exams and vaccinations. Advised patient regarding importance of keeping up with recommended health maintenance and to schedule as soon as possible if overdue, as this isimportant in assessing for undiagnosed pathology, especially cancer, as well as protecting against potentially harmful/life threatening disease. Patient and/or guardian verbalizes understanding andagrees with above counseling, assessment and plan. All questions answered. An electronic signature was used to authenticate this note.     --Therman Rubinstein, DO

## 2022-11-04 NOTE — ASSESSMENT & PLAN NOTE
No residual effects from subarachnoid hemorrhage. Follow-up CAT scans per recommendations of trauma surgeon/clinic.

## 2022-11-04 NOTE — ASSESSMENT & PLAN NOTE
No residual effects noted from a basilar skull fracture. Balance is intact. Speech is intact. No behavioral disturbances. Patient is off his Keppra. He continues on Neurontin 100 mg 1 by mouth 3 times a day per trauma service.

## 2022-11-04 NOTE — ASSESSMENT & PLAN NOTE
Follow-up on CAT scans per trauma service. I know the patient told me he is waiting a referral to a neurologist which was done through the trauma clinic. He does not have a date or time yet for that initial neurology consult.
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
## 2023-02-17 ENCOUNTER — OFFICE VISIT (OUTPATIENT)
Dept: FAMILY MEDICINE CLINIC | Age: 28
End: 2023-02-17
Payer: COMMERCIAL

## 2023-02-17 VITALS
RESPIRATION RATE: 16 BRPM | BODY MASS INDEX: 19.59 KG/M2 | SYSTOLIC BLOOD PRESSURE: 137 MMHG | DIASTOLIC BLOOD PRESSURE: 75 MMHG | HEIGHT: 72 IN | HEART RATE: 87 BPM | OXYGEN SATURATION: 97 % | WEIGHT: 144.6 LBS | TEMPERATURE: 99 F

## 2023-02-17 DIAGNOSIS — R55 SYNCOPE, UNSPECIFIED SYNCOPE TYPE: ICD-10-CM

## 2023-02-17 DIAGNOSIS — S06.9X9S TRAUMATIC BRAIN INJURY WITH LOSS OF CONSCIOUSNESS, SEQUELA (HCC): Primary | ICD-10-CM

## 2023-02-17 PROCEDURE — 99213 OFFICE O/P EST LOW 20 MIN: CPT | Performed by: STUDENT IN AN ORGANIZED HEALTH CARE EDUCATION/TRAINING PROGRAM

## 2023-02-17 ASSESSMENT — PATIENT HEALTH QUESTIONNAIRE - PHQ9
SUM OF ALL RESPONSES TO PHQ QUESTIONS 1-9: 0
SUM OF ALL RESPONSES TO PHQ9 QUESTIONS 1 & 2: 0
1. LITTLE INTEREST OR PLEASURE IN DOING THINGS: 0
SUM OF ALL RESPONSES TO PHQ QUESTIONS 1-9: 0
2. FEELING DOWN, DEPRESSED OR HOPELESS: 0

## 2023-03-01 ENCOUNTER — HOSPITAL ENCOUNTER (OUTPATIENT)
Dept: MRI IMAGING | Age: 28
Discharge: HOME | End: 2023-03-03
Payer: COMMERCIAL

## 2023-03-01 DIAGNOSIS — S06.9X9S TRAUMATIC BRAIN INJURY WITH LOSS OF CONSCIOUSNESS, SEQUELA (HCC): ICD-10-CM

## 2023-03-01 DIAGNOSIS — R55 SYNCOPE, UNSPECIFIED SYNCOPE TYPE: ICD-10-CM

## 2023-03-01 PROCEDURE — 70551 MRI BRAIN STEM W/O DYE: CPT

## 2023-03-03 ENCOUNTER — TELEPHONE (OUTPATIENT)
Dept: FAMILY MEDICINE CLINIC | Age: 28
End: 2023-03-03
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
## 2023-03-17 ENCOUNTER — OFFICE VISIT (OUTPATIENT)
Dept: FAMILY MEDICINE CLINIC | Age: 28
End: 2023-03-17
Payer: COMMERCIAL

## 2023-03-17 VITALS
HEIGHT: 72 IN | HEART RATE: 92 BPM | SYSTOLIC BLOOD PRESSURE: 123 MMHG | WEIGHT: 147.6 LBS | RESPIRATION RATE: 20 BRPM | DIASTOLIC BLOOD PRESSURE: 76 MMHG | OXYGEN SATURATION: 98 % | BODY MASS INDEX: 19.99 KG/M2 | TEMPERATURE: 97.1 F

## 2023-03-17 DIAGNOSIS — F10.10 ALCOHOL ABUSE: ICD-10-CM

## 2023-03-17 DIAGNOSIS — S06.9X9D TRAUMATIC BRAIN INJURY WITH LOSS OF CONSCIOUSNESS, SUBSEQUENT ENCOUNTER: ICD-10-CM

## 2023-03-17 DIAGNOSIS — R55 PRE-SYNCOPE: Primary | ICD-10-CM

## 2023-03-17 PROBLEM — I60.9 SAH (SUBARACHNOID HEMORRHAGE) (HCC): Status: RESOLVED | Noted: 2022-10-21 | Resolved: 2023-03-17

## 2023-03-17 PROBLEM — S02.109A CLOSED SKULL BASE FX (HCC): Status: RESOLVED | Noted: 2022-11-04 | Resolved: 2023-03-17

## 2023-03-17 PROBLEM — S06.5XAA SDH (SUBDURAL HEMATOMA) (HCC): Status: RESOLVED | Noted: 2022-10-09 | Resolved: 2023-03-17

## 2023-03-17 PROCEDURE — 99214 OFFICE O/P EST MOD 30 MIN: CPT | Performed by: STUDENT IN AN ORGANIZED HEALTH CARE EDUCATION/TRAINING PROGRAM

## 2023-03-17 RX ORDER — NALTREXONE HYDROCHLORIDE 50 MG/1
50 TABLET, FILM COATED ORAL DAILY
Qty: 60 TABLET | Refills: 0 | Status: SHIPPED | OUTPATIENT
Start: 2023-03-17 | End: 2023-05-16
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
## 2024-06-25 ENCOUNTER — HOSPITAL ENCOUNTER (EMERGENCY)
Age: 29
Discharge: HOME | End: 2024-06-26
Attending: STUDENT IN AN ORGANIZED HEALTH CARE EDUCATION/TRAINING PROGRAM
Payer: COMMERCIAL

## 2024-06-25 DIAGNOSIS — F10.930 ALCOHOL WITHDRAWAL SYNDROME WITHOUT COMPLICATION (HCC): Primary | ICD-10-CM

## 2024-06-25 LAB
ALBUMIN SERPL-MCNC: 4.9 G/DL (ref 3.5–5.2)
ALP SERPL-CCNC: 56 U/L (ref 40–129)
ALT SERPL-CCNC: 29 U/L (ref 0–40)
ANION GAP SERPL CALCULATED.3IONS-SCNC: 20 MMOL/L (ref 7–16)
AST SERPL-CCNC: 54 U/L (ref 0–39)
BASOPHILS # BLD: 0.09 K/UL (ref 0–0.2)
BASOPHILS NFR BLD: 2 % (ref 0–2)
BILIRUB SERPL-MCNC: 0.6 MG/DL (ref 0–1.2)
BUN SERPL-MCNC: 10 MG/DL (ref 6–20)
CALCIUM SERPL-MCNC: 9.2 MG/DL (ref 8.6–10.2)
CHLORIDE SERPL-SCNC: 98 MMOL/L (ref 98–107)
CK SERPL-CCNC: 81 U/L (ref 20–200)
CO2 SERPL-SCNC: 26 MMOL/L (ref 22–29)
CREAT SERPL-MCNC: 0.8 MG/DL (ref 0.7–1.2)
EOSINOPHIL # BLD: 0.02 K/UL (ref 0.05–0.5)
EOSINOPHILS RELATIVE PERCENT: 0 % (ref 0–6)
ERYTHROCYTE [DISTWIDTH] IN BLOOD BY AUTOMATED COUNT: 12 % (ref 11.5–15)
GFR, ESTIMATED: >90 ML/MIN/1.73M2
GLUCOSE SERPL-MCNC: 79 MG/DL (ref 74–99)
HCT VFR BLD AUTO: 44.5 % (ref 37–54)
HGB BLD-MCNC: 16 G/DL (ref 12.5–16.5)
IMM GRANULOCYTES # BLD AUTO: <0.03 K/UL (ref 0–0.58)
IMM GRANULOCYTES NFR BLD: 0 % (ref 0–5)
INR PPP: 1
LIPASE SERPL-CCNC: 88 U/L (ref 13–60)
LYMPHOCYTES NFR BLD: 1.35 K/UL (ref 1.5–4)
LYMPHOCYTES RELATIVE PERCENT: 30 % (ref 20–42)
MAGNESIUM SERPL-MCNC: 1.6 MG/DL (ref 1.6–2.6)
MCH RBC QN AUTO: 32.9 PG (ref 26–35)
MCHC RBC AUTO-ENTMCNC: 36 G/DL (ref 32–34.5)
MCV RBC AUTO: 91.4 FL (ref 80–99.9)
MONOCYTES NFR BLD: 0.49 K/UL (ref 0.1–0.95)
MONOCYTES NFR BLD: 11 % (ref 2–12)
NEUTROPHILS NFR BLD: 56 % (ref 43–80)
NEUTS SEG NFR BLD: 2.5 K/UL (ref 1.8–7.3)
PLATELET # BLD AUTO: 216 K/UL (ref 130–450)
PMV BLD AUTO: 9.4 FL (ref 7–12)
POTASSIUM SERPL-SCNC: 4 MMOL/L (ref 3.5–5)
PROT SERPL-MCNC: 7.8 G/DL (ref 6.4–8.3)
PROTHROMBIN TIME: 11.1 SEC (ref 9.3–12.4)
RBC # BLD AUTO: 4.87 M/UL (ref 3.8–5.8)
SODIUM SERPL-SCNC: 144 MMOL/L (ref 132–146)
WBC OTHER # BLD: 4.5 K/UL (ref 4.5–11.5)

## 2024-06-25 PROCEDURE — G0480 DRUG TEST DEF 1-7 CLASSES: HCPCS

## 2024-06-25 PROCEDURE — 81001 URINALYSIS AUTO W/SCOPE: CPT

## 2024-06-25 PROCEDURE — 99284 EMERGENCY DEPT VISIT MOD MDM: CPT

## 2024-06-25 PROCEDURE — 80307 DRUG TEST PRSMV CHEM ANLYZR: CPT

## 2024-06-25 PROCEDURE — 83735 ASSAY OF MAGNESIUM: CPT

## 2024-06-25 PROCEDURE — 80179 DRUG ASSAY SALICYLATE: CPT

## 2024-06-25 PROCEDURE — 2580000003 HC RX 258: Performed by: EMERGENCY MEDICINE

## 2024-06-25 PROCEDURE — 93005 ELECTROCARDIOGRAM TRACING: CPT | Performed by: EMERGENCY MEDICINE

## 2024-06-25 PROCEDURE — 96375 TX/PRO/DX INJ NEW DRUG ADDON: CPT

## 2024-06-25 PROCEDURE — 80053 COMPREHEN METABOLIC PANEL: CPT

## 2024-06-25 PROCEDURE — 83690 ASSAY OF LIPASE: CPT

## 2024-06-25 PROCEDURE — 85610 PROTHROMBIN TIME: CPT

## 2024-06-25 PROCEDURE — 6360000002 HC RX W HCPCS: Performed by: EMERGENCY MEDICINE

## 2024-06-25 PROCEDURE — 85025 COMPLETE CBC W/AUTO DIFF WBC: CPT

## 2024-06-25 PROCEDURE — 80143 DRUG ASSAY ACETAMINOPHEN: CPT

## 2024-06-25 PROCEDURE — 6360000002 HC RX W HCPCS: Performed by: STUDENT IN AN ORGANIZED HEALTH CARE EDUCATION/TRAINING PROGRAM

## 2024-06-25 PROCEDURE — 82550 ASSAY OF CK (CPK): CPT

## 2024-06-25 PROCEDURE — 96374 THER/PROPH/DIAG INJ IV PUSH: CPT

## 2024-06-25 RX ORDER — ONDANSETRON 2 MG/ML
4 INJECTION INTRAMUSCULAR; INTRAVENOUS ONCE
Status: COMPLETED | OUTPATIENT
Start: 2024-06-25 | End: 2024-06-25

## 2024-06-25 RX ORDER — LANOLIN ALCOHOL/MO/W.PET/CERES
100 CREAM (GRAM) TOPICAL DAILY
Status: DISCONTINUED | OUTPATIENT
Start: 2024-06-26 | End: 2024-06-26 | Stop reason: HOSPADM

## 2024-06-25 RX ORDER — PHENOBARBITAL SODIUM 65 MG/ML
130 INJECTION, SOLUTION INTRAMUSCULAR; INTRAVENOUS ONCE
Status: COMPLETED | OUTPATIENT
Start: 2024-06-25 | End: 2024-06-25

## 2024-06-25 RX ORDER — SODIUM CHLORIDE 0.9 % (FLUSH) 0.9 %
5-40 SYRINGE (ML) INJECTION PRN
Status: DISCONTINUED | OUTPATIENT
Start: 2024-06-25 | End: 2024-06-26 | Stop reason: HOSPADM

## 2024-06-25 RX ORDER — LORAZEPAM 2 MG/ML
4 INJECTION INTRAMUSCULAR
Status: DISCONTINUED | OUTPATIENT
Start: 2024-06-25 | End: 2024-06-26 | Stop reason: HOSPADM

## 2024-06-25 RX ORDER — LORAZEPAM 1 MG/1
2 TABLET ORAL
Status: DISCONTINUED | OUTPATIENT
Start: 2024-06-25 | End: 2024-06-26 | Stop reason: HOSPADM

## 2024-06-25 RX ORDER — SODIUM CHLORIDE 0.9 % (FLUSH) 0.9 %
5-40 SYRINGE (ML) INJECTION EVERY 12 HOURS SCHEDULED
Status: DISCONTINUED | OUTPATIENT
Start: 2024-06-25 | End: 2024-06-26 | Stop reason: HOSPADM

## 2024-06-25 RX ORDER — 0.9 % SODIUM CHLORIDE 0.9 %
1000 INTRAVENOUS SOLUTION INTRAVENOUS ONCE
Status: COMPLETED | OUTPATIENT
Start: 2024-06-25 | End: 2024-06-25

## 2024-06-25 RX ORDER — LORAZEPAM 2 MG/ML
1 INJECTION INTRAMUSCULAR
Status: DISCONTINUED | OUTPATIENT
Start: 2024-06-25 | End: 2024-06-26 | Stop reason: HOSPADM

## 2024-06-25 RX ORDER — HYDROCODONE BITARTRATE AND ACETAMINOPHEN 5; 325 MG/1; MG/1
1 TABLET ORAL ONCE
Status: DISCONTINUED | OUTPATIENT
Start: 2024-06-25 | End: 2024-06-25

## 2024-06-25 RX ORDER — LORAZEPAM 1 MG/1
4 TABLET ORAL
Status: DISCONTINUED | OUTPATIENT
Start: 2024-06-25 | End: 2024-06-26 | Stop reason: HOSPADM

## 2024-06-25 RX ORDER — LORAZEPAM 2 MG/ML
2 INJECTION INTRAMUSCULAR
Status: DISCONTINUED | OUTPATIENT
Start: 2024-06-25 | End: 2024-06-26 | Stop reason: HOSPADM

## 2024-06-25 RX ORDER — LORAZEPAM 2 MG/ML
3 INJECTION INTRAMUSCULAR
Status: DISCONTINUED | OUTPATIENT
Start: 2024-06-25 | End: 2024-06-26 | Stop reason: HOSPADM

## 2024-06-25 RX ORDER — LORAZEPAM 1 MG/1
3 TABLET ORAL
Status: DISCONTINUED | OUTPATIENT
Start: 2024-06-25 | End: 2024-06-26 | Stop reason: HOSPADM

## 2024-06-25 RX ORDER — LORAZEPAM 1 MG/1
1 TABLET ORAL
Status: DISCONTINUED | OUTPATIENT
Start: 2024-06-25 | End: 2024-06-26 | Stop reason: HOSPADM

## 2024-06-25 RX ORDER — SODIUM CHLORIDE 9 MG/ML
INJECTION, SOLUTION INTRAVENOUS PRN
Status: DISCONTINUED | OUTPATIENT
Start: 2024-06-25 | End: 2024-06-26 | Stop reason: HOSPADM

## 2024-06-25 RX ADMIN — SODIUM CHLORIDE 1000 ML: 9 INJECTION, SOLUTION INTRAVENOUS at 22:17

## 2024-06-25 RX ADMIN — Medication 10 ML: at 23:51

## 2024-06-25 RX ADMIN — ONDANSETRON 4 MG: 2 INJECTION INTRAMUSCULAR; INTRAVENOUS at 22:23

## 2024-06-25 RX ADMIN — LORAZEPAM 2 MG: 2 INJECTION INTRAMUSCULAR; INTRAVENOUS at 22:30

## 2024-06-25 RX ADMIN — PHENOBARBITAL SODIUM 130 MG: 65 INJECTION INTRAMUSCULAR at 23:40

## 2024-06-25 ASSESSMENT — LIFESTYLE VARIABLES
HOW MANY STANDARD DRINKS CONTAINING ALCOHOL DO YOU HAVE ON A TYPICAL DAY: 7 TO 9
HOW OFTEN DO YOU HAVE A DRINK CONTAINING ALCOHOL: 4 OR MORE TIMES A WEEK

## 2024-06-25 ASSESSMENT — PAIN - FUNCTIONAL ASSESSMENT: PAIN_FUNCTIONAL_ASSESSMENT: NONE - DENIES PAIN

## 2024-06-26 VITALS
HEART RATE: 86 BPM | OXYGEN SATURATION: 97 % | TEMPERATURE: 98.7 F | WEIGHT: 145 LBS | DIASTOLIC BLOOD PRESSURE: 70 MMHG | BODY MASS INDEX: 19.64 KG/M2 | HEIGHT: 72 IN | SYSTOLIC BLOOD PRESSURE: 136 MMHG | RESPIRATION RATE: 17 BRPM

## 2024-06-26 LAB
AMPHET UR QL SCN: NEGATIVE
APAP SERPL-MCNC: <5 UG/ML (ref 10–30)
BARBITURATES UR QL SCN: NEGATIVE
BENZODIAZ UR QL: NEGATIVE
BILIRUB UR QL STRIP: NEGATIVE
BUPRENORPHINE UR QL: NEGATIVE
CANNABINOIDS UR QL SCN: NEGATIVE
CASTS #/AREA URNS LPF: ABNORMAL /LPF
CLARITY UR: CLEAR
COCAINE UR QL SCN: NEGATIVE
COLOR UR: YELLOW
ETHANOLAMINE SERPL-MCNC: 158 MG/DL (ref 0–0.08)
ETHANOLAMINE SERPL-MCNC: 233 MG/DL (ref 0–0.08)
FENTANYL UR QL: NEGATIVE
GLUCOSE UR STRIP-MCNC: NEGATIVE MG/DL
HGB UR QL STRIP.AUTO: NEGATIVE
KETONES UR STRIP-MCNC: 40 MG/DL
LEUKOCYTE ESTERASE UR QL STRIP: NEGATIVE
METHADONE UR QL: NEGATIVE
NITRITE UR QL STRIP: NEGATIVE
OPIATES UR QL SCN: NEGATIVE
OXYCODONE UR QL SCN: NEGATIVE
PCP UR QL SCN: NEGATIVE
PH UR STRIP: 5.5 [PH] (ref 5–9)
PROT UR STRIP-MCNC: ABNORMAL MG/DL
RBC #/AREA URNS HPF: ABNORMAL /HPF
SALICYLATES SERPL-MCNC: <0.3 MG/DL (ref 0–30)
SP GR UR STRIP: 1.02 (ref 1–1.03)
TEST INFORMATION: NORMAL
TOXIC TRICYCLIC SC,BLOOD: NEGATIVE
UROBILINOGEN UR STRIP-ACNC: 0.2 EU/DL (ref 0–1)
WBC #/AREA URNS HPF: ABNORMAL /HPF

## 2024-06-26 PROCEDURE — G0480 DRUG TEST DEF 1-7 CLASSES: HCPCS

## 2024-06-26 RX ORDER — CHLORDIAZEPOXIDE HYDROCHLORIDE 25 MG/1
CAPSULE, GELATIN COATED ORAL
Qty: 10 CAPSULE | Refills: 0 | Status: SHIPPED | OUTPATIENT
Start: 2024-06-26 | End: 2024-06-29

## 2024-06-26 NOTE — ED PROVIDER NOTES
Department of Emergency Medicine   ED  Provider Note  Admit Date/RoomTime: 6/25/2024  9:20 PM  ED Room: 05/05          History of Present Illness:    6/25/24, Time: 10:12 PM EDT         Mitch lAford is a 29 y.o. male presenting to the ED for present emergency department complaint of alcohol withdrawal.  Usually drinks about 1/5 of liquor daily.  Ongoing for the past 2 months.  States last drink was around 10 AM.  Came in due to having withdrawal symptoms.  Has had withdrawal in the past.  States he is having a lot of anxiety tremors and nausea and vomiting.  He otherwise denies any fevers chills chest pain shortness of breath no other acute complaints this time.,       Review of Systems:   Pertinent positives and negatives are stated within HPI, all other systems reviewed and are negative.        --------------------------------------------- PAST HISTORY ---------------------------------------------  Past Medical History:  has a past medical history of Closed skull base fx (HCC), SAH (subarachnoid hemorrhage) (HCC), and SDH (subdural hematoma) (HCC).    Past Surgical History:  has no past surgical history on file.    Social History:  reports that he has been smoking cigarettes. He has never used smokeless tobacco. He reports current alcohol use. He reports current drug use. Frequency: 2.00 times per week. Drug: Marijuana (Weed).    Family History: family history is not on file.     The patient’s home medications have been reviewed.    Allergies: Patient has no known allergies.        ---------------------------------------------------PHYSICAL EXAM--------------------------------------    Constitutional/General: Alert and oriented x3, ill-appearing  Head: Normocephalic and atraumatic  Eyes: PERRL, EOMI, conjunctiva normal, sclera non icteric  Mouth: Oropharynx clear, handling secretions,   Neck: Supple, full ROM, no stridor, no meningeal signs  Respiratory: Lungs clear to auscultation bilaterally, no wheezes,

## 2024-06-26 NOTE — ED NOTES
Patient placed on cardiac monitor and continuous pulse ox, patient informed to use the call light when he needs to get up, patient understands

## 2024-06-27 LAB
EKG ATRIAL RATE: 80 BPM
EKG P AXIS: 69 DEGREES
EKG P-R INTERVAL: 136 MS
EKG Q-T INTERVAL: 400 MS
EKG QRS DURATION: 110 MS
EKG QTC CALCULATION (BAZETT): 461 MS
EKG R AXIS: 12 DEGREES
EKG T AXIS: 39 DEGREES
EKG VENTRICULAR RATE: 80 BPM

## 2024-06-27 PROCEDURE — 93010 ELECTROCARDIOGRAM REPORT: CPT | Performed by: INTERNAL MEDICINE

## 2024-12-14 ENCOUNTER — HOSPITAL ENCOUNTER (EMERGENCY)
Age: 29
Discharge: HOME | End: 2024-12-14
Payer: COMMERCIAL

## 2024-12-14 VITALS
DIASTOLIC BLOOD PRESSURE: 106 MMHG | HEART RATE: 90 BPM | RESPIRATION RATE: 16 BRPM | SYSTOLIC BLOOD PRESSURE: 147 MMHG | WEIGHT: 155 LBS | TEMPERATURE: 98.1 F | BODY MASS INDEX: 21.02 KG/M2 | OXYGEN SATURATION: 98 %
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
## 2024-12-14 DIAGNOSIS — F10.920 ACUTE ALCOHOLIC INTOXICATION WITHOUT COMPLICATION (HCC): Primary | ICD-10-CM

## 2024-12-14 PROCEDURE — 99283 EMERGENCY DEPT VISIT LOW MDM: CPT

## 2024-12-14 ASSESSMENT — PAIN - FUNCTIONAL ASSESSMENT: PAIN_FUNCTIONAL_ASSESSMENT: NONE - DENIES PAIN

## 2024-12-14 NOTE — ED PROVIDER NOTES
(subarachnoid hemorrhage) (HCC) (10/21/2022), and SDH (subdural hematoma) (10/09/2022).     SURGICAL HISTORY   History reviewed. No pertinent surgical history.    CURRENTMEDICATIONS       Discharge Medication List as of 12/14/2024  5:02 PM        CONTINUE these medications which have NOT CHANGED    Details   BUSPIRONE HCL PO Take by mouthHistorical Med             ALLERGIES     Patient has no known allergies.    FAMILYHISTORY     History reviewed. No pertinent family history.     SOCIAL HISTORY       Social History     Tobacco Use    Smoking status: Every Day     Current packs/day: 1.00     Types: Cigarettes    Smokeless tobacco: Never   Substance Use Topics    Alcohol use: Yes     Comment: patient states he drinks a couple drinks every night    Drug use: Yes     Frequency: 2.0 times per week     Types: Marijuana (Weed)       SCREENINGS        [place] Coma Scale  Eye Opening: Spontaneous  Best Verbal Response: Oriented  Best Motor Response: Obeys commands  Geeta Coma Scale Score: 15                CIWA Assessment  BP: (!) 147/106  Pulse: 90           PHYSICAL EXAM  1 or more Elements     ED Triage Vitals   BP Systolic BP Percentile Diastolic BP Percentile Temp Temp Source Pulse Respirations SpO2   12/14/24 1621 -- -- 12/14/24 1621 12/14/24 1621 12/14/24 1621 12/14/24 1621 12/14/24 1621   (!) 147/106   98.1 °F (36.7 °C) Oral 90 16 98 %      Height Weight - Scale         -- 12/14/24 1620          70.3 kg (155 lb)             Constitutional/General: Alert and oriented x3  Head: Normocephalic and atraumatic  Eyes: PERRL, EOMI, conjunctiva normal, sclera non icteric  ENT:  Oropharynx clear, handling secretions, no trismus, no asymmetry of the posterior oropharynx or uvular edema  Neck: Supple, full ROM, no stridor, no meningeal signs  Respiratory: Lungs clear to auscultation bilaterally, no wheezes, rales, or rhonchi. Not in respiratory distress  Cardiovascular:  Regular rate. Regular rhythm. No murmurs, no gallops, no  12/14/2024 05:00:02 PM    PATIENT REFERRED TO:  Mich Lizarraga DO  [address] [facility]  [address]  [place] [id number]  [phone]    Schedule an appointment as soon as possible for a visit in 1 week        DISCHARGE MEDICATIONS:  Discharge Medication List as of 12/14/2024  5:02 PM          DISCONTINUED MEDICATIONS:  Discharge Medication List as of 12/14/2024  5:02 PM               (Please note that portions of this note were completed with a voice recognition program.  Efforts were made to edit the dictations but occasionally words are mis-transcribed.)    MYLA Ortiz - CNP (electronically signed)

## 2024-12-14 NOTE — DISCHARGE INSTRUCTIONS
Make sure you are drinking plenty of fluid.  Please avoid use of kratom in the future.  Please limit your use of alcohol.

## 2025-04-10 NOTE — TELEPHONE ENCOUNTER
PATIENT SCHEDULED     Date: 4/25/2022 Status: [facility]   Time: 8:50 AM Length: 10   Visit Type: NEW PATIENT [1003] Copay: $0.00   Provider: Joby Martinez MD Department: [address] [place] [address] Dr   Referral #: [id number] Referral Status: Open   Referring Provider: Betsy Price Patient Type:         Auto Confirm Status: BUSY   Notes: Ref: Dr Jozef Esqueda // Acute tear of anterior cruciate ligament of knee *MRI + XR in Epic* no

## 2025-06-01 ENCOUNTER — APPOINTMENT (OUTPATIENT)
Dept: CARDIOLOGY | Facility: HOSPITAL | Age: 30
End: 2025-06-01
Payer: COMMERCIAL

## 2025-06-01 ENCOUNTER — HOSPITAL ENCOUNTER (EMERGENCY)
Facility: HOSPITAL | Age: 30
Discharge: HOME | End: 2025-06-01
Attending: STUDENT IN AN ORGANIZED HEALTH CARE EDUCATION/TRAINING PROGRAM
Payer: COMMERCIAL

## 2025-06-01 ENCOUNTER — APPOINTMENT (OUTPATIENT)
Dept: RADIOLOGY | Facility: HOSPITAL | Age: 30
End: 2025-06-01
Payer: COMMERCIAL

## 2025-06-01 VITALS
OXYGEN SATURATION: 97 % | SYSTOLIC BLOOD PRESSURE: 132 MMHG | TEMPERATURE: 99.3 F | HEIGHT: 70 IN | RESPIRATION RATE: 16 BRPM | BODY MASS INDEX: 22.98 KG/M2 | HEART RATE: 83 BPM | WEIGHT: 160.5 LBS | DIASTOLIC BLOOD PRESSURE: 93 MMHG
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
## 2025-06-01 DIAGNOSIS — F19.90 POLYSUBSTANCE USE DISORDER: ICD-10-CM

## 2025-06-01 LAB
ALBUMIN SERPL BCP-MCNC: 4.9 G/DL (ref 3.4–5)
ALP SERPL-CCNC: 39 U/L (ref 33–120)
ALT SERPL W P-5'-P-CCNC: 13 U/L (ref 10–52)
AMPHETAMINES UR QL SCN: NORMAL
ANION GAP BLDV CALCULATED.4IONS-SCNC: 13 MMOL/L (ref 10–25)
ANION GAP SERPL CALC-SCNC: 16 MMOL/L (ref 10–20)
AST SERPL W P-5'-P-CCNC: 19 U/L (ref 9–39)
BARBITURATES UR QL SCN: NORMAL
BASE EXCESS BLDV CALC-SCNC: 0.5 MMOL/L (ref -2–3)
BASOPHILS # BLD AUTO: 0.06 X10*3/UL (ref 0–0.1)
BASOPHILS NFR BLD AUTO: 1 %
BENZODIAZ UR QL SCN: NORMAL
BILIRUB SERPL-MCNC: 0.6 MG/DL (ref 0–1.2)
BODY TEMPERATURE: 37 DEGREES CELSIUS
BUN SERPL-MCNC: 7 MG/DL (ref 6–23)
BZE UR QL SCN: NORMAL
CA-I BLDV-SCNC: 1.1 MMOL/L (ref 1.1–1.33)
CALCIUM SERPL-MCNC: 8.8 MG/DL (ref 8.6–10.3)
CANNABINOIDS UR QL SCN: NORMAL
CARDIAC TROPONIN I PNL SERPL HS: <3 NG/L (ref 0–20)
CHLORIDE BLDV-SCNC: 102 MMOL/L (ref 98–107)
CHLORIDE SERPL-SCNC: 102 MMOL/L (ref 98–107)
CO2 SERPL-SCNC: 24 MMOL/L (ref 21–32)
CREAT SERPL-MCNC: 0.83 MG/DL (ref 0.5–1.3)
EGFRCR SERPLBLD CKD-EPI 2021: >90 ML/MIN/1.73M*2
EOSINOPHIL # BLD AUTO: 0.02 X10*3/UL (ref 0–0.7)
EOSINOPHIL NFR BLD AUTO: 0.3 %
ERYTHROCYTE [DISTWIDTH] IN BLOOD BY AUTOMATED COUNT: 12 % (ref 11.5–14.5)
ETHANOL SERPL-MCNC: 254 MG/DL
FENTANYL+NORFENTANYL UR QL SCN: NORMAL
GLUCOSE BLDV-MCNC: 108 MG/DL (ref 74–99)
GLUCOSE SERPL-MCNC: 110 MG/DL (ref 74–99)
HCO3 BLDV-SCNC: 27.8 MMOL/L (ref 22–26)
HCT VFR BLD AUTO: 44.7 % (ref 41–52)
HCT VFR BLD EST: 47 % (ref 41–52)
HGB BLD-MCNC: 15.7 G/DL (ref 13.5–17.5)
HGB BLDV-MCNC: 15.5 G/DL (ref 13.5–17.5)
IMM GRANULOCYTES # BLD AUTO: 0.02 X10*3/UL (ref 0–0.7)
IMM GRANULOCYTES NFR BLD AUTO: 0.3 % (ref 0–0.9)
INHALED O2 CONCENTRATION: 21 %
LACTATE BLDV-SCNC: 2 MMOL/L (ref 0.4–2)
LYMPHOCYTES # BLD AUTO: 1.36 X10*3/UL (ref 1.2–4.8)
LYMPHOCYTES NFR BLD AUTO: 23.7 %
MCH RBC QN AUTO: 31.2 PG (ref 26–34)
MCHC RBC AUTO-ENTMCNC: 35.1 G/DL (ref 32–36)
MCV RBC AUTO: 89 FL (ref 80–100)
METHADONE UR QL SCN: NORMAL
MONOCYTES # BLD AUTO: 0.68 X10*3/UL (ref 0.1–1)
MONOCYTES NFR BLD AUTO: 11.8 %
NEUTROPHILS # BLD AUTO: 3.61 X10*3/UL (ref 1.2–7.7)
NEUTROPHILS NFR BLD AUTO: 62.9 %
NRBC BLD-RTO: 0 /100 WBCS (ref 0–0)
OPIATES UR QL SCN: NORMAL
OXYCODONE+OXYMORPHONE UR QL SCN: NORMAL
OXYHGB MFR BLDV: 81.4 % (ref 45–75)
PCO2 BLDV: 54 MM HG (ref 41–51)
PCP UR QL SCN: NORMAL
PH BLDV: 7.32 PH (ref 7.33–7.43)
PLATELET # BLD AUTO: 267 X10*3/UL (ref 150–450)
PO2 BLDV: 53 MM HG (ref 35–45)
POTASSIUM BLDV-SCNC: 3.6 MMOL/L (ref 3.5–5.3)
POTASSIUM SERPL-SCNC: 3.5 MMOL/L (ref 3.5–5.3)
PROT SERPL-MCNC: 7.7 G/DL (ref 6.4–8.2)
RBC # BLD AUTO: 5.03 X10*6/UL (ref 4.5–5.9)
SAO2 % BLDV: 83 % (ref 45–75)
SODIUM BLDV-SCNC: 139 MMOL/L (ref 136–145)
SODIUM SERPL-SCNC: 138 MMOL/L (ref 136–145)
WBC # BLD AUTO: 5.8 X10*3/UL (ref 4.4–11.3)

## 2025-06-01 PROCEDURE — 99285 EMERGENCY DEPT VISIT HI MDM: CPT | Mod: 25 | Performed by: STUDENT IN AN ORGANIZED HEALTH CARE EDUCATION/TRAINING PROGRAM

## 2025-06-01 PROCEDURE — 84484 ASSAY OF TROPONIN QUANT: CPT | Performed by: STUDENT IN AN ORGANIZED HEALTH CARE EDUCATION/TRAINING PROGRAM

## 2025-06-01 PROCEDURE — 84132 ASSAY OF SERUM POTASSIUM: CPT | Performed by: STUDENT IN AN ORGANIZED HEALTH CARE EDUCATION/TRAINING PROGRAM

## 2025-06-01 PROCEDURE — 80307 DRUG TEST PRSMV CHEM ANLYZR: CPT | Performed by: STUDENT IN AN ORGANIZED HEALTH CARE EDUCATION/TRAINING PROGRAM

## 2025-06-01 PROCEDURE — 70450 CT HEAD/BRAIN W/O DYE: CPT | Performed by: RADIOLOGY

## 2025-06-01 PROCEDURE — 82435 ASSAY OF BLOOD CHLORIDE: CPT | Mod: 59 | Performed by: STUDENT IN AN ORGANIZED HEALTH CARE EDUCATION/TRAINING PROGRAM

## 2025-06-01 PROCEDURE — 82077 ASSAY SPEC XCP UR&BREATH IA: CPT | Performed by: STUDENT IN AN ORGANIZED HEALTH CARE EDUCATION/TRAINING PROGRAM

## 2025-06-01 PROCEDURE — 70450 CT HEAD/BRAIN W/O DYE: CPT

## 2025-06-01 PROCEDURE — 85025 COMPLETE CBC W/AUTO DIFF WBC: CPT | Performed by: STUDENT IN AN ORGANIZED HEALTH CARE EDUCATION/TRAINING PROGRAM

## 2025-06-01 PROCEDURE — 36415 COLL VENOUS BLD VENIPUNCTURE: CPT | Performed by: STUDENT IN AN ORGANIZED HEALTH CARE EDUCATION/TRAINING PROGRAM

## 2025-06-01 PROCEDURE — 85018 HEMOGLOBIN: CPT | Mod: 59 | Performed by: STUDENT IN AN ORGANIZED HEALTH CARE EDUCATION/TRAINING PROGRAM

## 2025-06-01 PROCEDURE — 93005 ELECTROCARDIOGRAM TRACING: CPT

## 2025-06-01 ASSESSMENT — COLUMBIA-SUICIDE SEVERITY RATING SCALE - C-SSRS
2. HAVE YOU ACTUALLY HAD ANY THOUGHTS OF KILLING YOURSELF?: NO
6. HAVE YOU EVER DONE ANYTHING, STARTED TO DO ANYTHING, OR PREPARED TO DO ANYTHING TO END YOUR LIFE?: NO
1. IN THE PAST MONTH, HAVE YOU WISHED YOU WERE DEAD OR WISHED YOU COULD GO TO SLEEP AND NOT WAKE UP?: NO

## 2025-06-01 ASSESSMENT — LIFESTYLE VARIABLES
ORIENTATION AND CLOUDING OF SENSORIUM: ORIENTED AND CAN DO SERIAL ADDITIONS
TREMOR: NO TREMOR
PAROXYSMAL SWEATS: NO SWEAT VISIBLE
ANXIETY: MILDLY ANXIOUS
HEADACHE, FULLNESS IN HEAD: NOT PRESENT
AGITATION: NORMAL ACTIVITY
NAUSEA AND VOMITING: 2
TOTAL SCORE: 3
AUDITORY DISTURBANCES: NOT PRESENT
VISUAL DISTURBANCES: NOT PRESENT

## 2025-06-01 ASSESSMENT — PAIN SCALES - GENERAL: PAINLEVEL_OUTOF10: 0 - NO PAIN

## 2025-06-01 ASSESSMENT — PAIN - FUNCTIONAL ASSESSMENT: PAIN_FUNCTIONAL_ASSESSMENT: 0-10
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
## 2025-06-02 LAB
ATRIAL RATE: 89 BPM
P AXIS: 72 DEGREES
PR INTERVAL: 145 MS
Q ONSET: 252 MS
QRS COUNT: 14 BEATS
QRS DURATION: 106 MS
QT INTERVAL: 373 MS
QTC CALCULATION(BAZETT): 449 MS
QTC FREDERICIA: 422 MS
R AXIS: -23 DEGREES
T AXIS: 51 DEGREES
T OFFSET: 438 MS
VENTRICULAR RATE: 87 BPM

## 2025-06-02 NOTE — ED PROVIDER NOTES
"    HPI   Chief Complaint   Patient presents with    Dizziness    Fall    Altered Mental Status       HPI: 30-year-old male, history of alcohol use disorder as well as polysubstance use, takes kratom, with a past medical history also of anxiety and prior intracranial hemorrhages who is presenting to the emergency department today after an episode of altered mental status.  Patient was in the [employer], when he was acting strangely.  Reportedly was helped to the ground, they do not think that he hit his head, but the patient does not remember the episode.  No seizure-like activity was reported or witnessed.  Patient was brought in for further evaluation.  Patient does report drinking this evening as well as using kratom, last about 4 PM to this afternoon.      ROS: Complete 12 point review of systems performed, otherwise negative except as noted in the history of present illness    PMH: Reviewed, documented below in note. Pertinents in HPI  PSH: Reviewed and documented below in note. Pertinents in HPI  SH: Polysubstance use history.  Not homeless  Fam: Reviewed, noncontributory to patients current complaint  MEDS: Reviewed and documented below in note. Pertinents in HPI  ALLERGIES: Reviewed and documented below in note.              History provided by:  Patient and EMS personnel   used: No                          Edgar Coma Scale Score: 14                  Patient History   Medical History[1]  Surgical History[2]  Family History[3]  Social History[4]    Physical Exam   Visit Vitals  BP (!) 132/93   Pulse 83   Temp 37.4 °C (99.3 °F)   Resp (!) 5   Ht 1.778 m (5' 10\")   Wt 72.8 kg (160 lb 7.9 oz)   SpO2 97%   BMI 23.03 kg/m²   BSA 1.9 m²      Physical Exam  Vitals and nursing note reviewed.   Constitutional:       General: He is not in acute distress.     Appearance: Normal appearance. He is well-developed.   HENT:      Head: Normocephalic and atraumatic.   Eyes:      Extraocular Movements: " Extraocular movements intact.      Right eye: Normal extraocular motion and no nystagmus.      Left eye: Normal extraocular motion and no nystagmus.      Pupils: Pupils are equal, round, and reactive to light.   Neck:      Vascular: No carotid bruit.   Cardiovascular:      Rate and Rhythm: Normal rate and regular rhythm.      Pulses: Normal pulses.      Heart sounds: Normal heart sounds.   Pulmonary:      Effort: Pulmonary effort is normal.      Breath sounds: Normal breath sounds.   Abdominal:      General: There is no distension.      Palpations: Abdomen is soft.      Tenderness: There is no abdominal tenderness. There is no guarding or rebound.   Musculoskeletal:         General: No tenderness, deformity or signs of injury.      Cervical back: Normal range of motion. No rigidity.   Skin:     General: Skin is warm and dry.      Capillary Refill: Capillary refill takes less than 2 seconds.   Neurological:      General: No focal deficit present.      Mental Status: He is alert. He is disoriented.      GCS: GCS eye subscore is 4. GCS verbal subscore is 4. GCS motor subscore is 6.      Cranial Nerves: No cranial nerve deficit, dysarthria or facial asymmetry.      Sensory: No sensory deficit.      Motor: No weakness.   Psychiatric:         Mood and Affect: Mood normal.         Behavior: Behavior normal.         CT head wo IV contrast   Final Result   No evidence of acute cortical infarct or intracranial hemorrhage.   Frontal lobe volume loss from prior insult. If persistent concern for   intracranial abnormality, MRI can be considered.             MACRO:   None        Signed by: Mauricio Alonzo 6/1/2025 8:31 PM   Dictation workstation:   [id number]          Labs Reviewed   COMPREHENSIVE METABOLIC PANEL - Abnormal       Result Value    Glucose 110 (*)     Sodium 138      Potassium 3.5      Chloride 102      Bicarbonate 24      Anion Gap 16      Urea Nitrogen 7      Creatinine 0.83      eGFR >90      Calcium 8.8       Albumin 4.9      Alkaline Phosphatase 39      Total Protein 7.7      AST 19      Bilirubin, Total 0.6      ALT 13     BLOOD GAS VENOUS FULL PANEL - Abnormal    POCT pH, Venous 7.32 (*)     POCT pCO2, Venous 54 (*)     POCT pO2, Venous 53 (*)     POCT SO2, Venous 83 (*)     POCT Oxy Hemoglobin, Venous 81.4 (*)     POCT Hematocrit Calculated, Venous 47.0      POCT Sodium, Venous 139      POCT Potassium, Venous 3.6      POCT Chloride, Venous 102      POCT Ionized Calicum, Venous 1.10      POCT Glucose, Venous 108 (*)     POCT Lactate, Venous 2.0      POCT Base Excess, Venous 0.5      POCT HCO3 Calculated, Venous 27.8 (*)     POCT Hemoglobin, Venous 15.5      POCT Anion Gap, Venous 13.0      Patient Temperature 37.0      FiO2 21     ALCOHOL - Abnormal    Alcohol 254 (*)    DRUG SCREEN,URINE - Normal    Amphetamine Screen, Urine Presumptive Negative      Barbiturate Screen, Urine Presumptive Negative      Benzodiazepines Screen, Urine Presumptive Negative      Cannabinoid Screen, Urine Presumptive Negative      Cocaine Metabolite Screen, Urine Presumptive Negative      Fentanyl Screen, Urine Presumptive Negative      Opiate Screen, Urine Presumptive Negative      Oxycodone Screen, Urine Presumptive Negative      PCP Screen, Urine Presumptive Negative      Methadone Screen, Urine Presumptive Negative      Narrative:     Drug screen results are presumptive and should not be used to assess   compliance with prescribed medication. Contact the performing [facility] laboratory   to add-on definitive confirmatory testing if clinically indicated.    Toxicology screening results are reported qualitatively. The concentration must   be greater than or equal to the cutoff to be reported as positive. The concentration   at which the screening test can detect an individual drug or metabolite varies.   The absence of expected drug(s) and/or drug metabolite(s) may indicate non-compliance,   inappropriate timing of specimen collection relative  to drug administration, poor drug   absorption, diluted/adulterated urine, or limitations of testing. For medical purposes   only; not valid for forensic use.    Interpretive questions should be directed to the laboratory medical directors.   TROPONIN I, HIGH SENSITIVITY - Normal    Troponin I, High Sensitivity <3      Narrative:     Less than 99th percentile of normal range cutoff-  Female and children under 18 years old <14 ng/L; Male <21 ng/L: Negative  Repeat testing should be performed if clinically indicated.     Female and children under 18 years old 14-50 ng/L; Male 21-50 ng/L:  Consistent with possible cardiac damage and possible increased clinical   risk. Serial measurements may help to assess extent of myocardial damage.     >50 ng/L: Consistent with cardiac damage, increased clinical risk and  myocardial infarction. Serial measurements may help assess extent of   myocardial damage.      NOTE: Children less than 1 year old may have higher baseline troponin   levels and results should be interpreted in conjunction with the overall   clinical context.     NOTE: Troponin I testing is performed using a different   testing methodology at [facility] than at other   [facility]. Direct result comparisons should only   be made within the same method.   CBC WITH AUTO DIFFERENTIAL    WBC 5.8      nRBC 0.0      RBC 5.03      Hemoglobin 15.7      Hematocrit 44.7      MCV 89      MCH 31.2      MCHC 35.1      RDW 12.0      Platelets 267      Neutrophils % 62.9      Immature Granulocytes %, Automated 0.3      Lymphocytes % 23.7      Monocytes % 11.8      Eosinophils % 0.3      Basophils % 1.0      Neutrophils Absolute 3.61      Immature Granulocytes Absolute, Automated 0.02      Lymphocytes Absolute 1.36      Monocytes Absolute 0.68      Eosinophils Absolute 0.02      Basophils Absolute 0.06           ED Course & [facility]   ED Course as of 06/01/25 2215   Sun Jun 01, 2025 2010 EKG as interpreted by  myself demonstrates sinus rhythm with a ventricular rate of 87, borderline left axis deviation noted, normal intervals, no evidence of an acute STEMI. [NS]      ED Course User Index  [NS] Ben Jackson MD         Diagnoses as of 06/01/25 2215   Fall, initial encounter   Polysubstance use disorder           Medical Decision Making  All mentioned lab results, ECGs, and imaging were independently reviewed by myself  - Patient evaluated. Patient is presenting to the emergency department for concern for altered mental status.  Differential includes but is not limited to potential alcohol and substance intoxication, lower suspicion for seizure as there was no witnessed seizure-like activity, he does have a history of intracranial hemorrhages, and this is on the differential although there was no reported head trauma.  Workup was undertaken with labs imaging and an EKG.  CT of the head shows no evidence of any acute cortical infarct or intracranial hemorrhage.  Labs indicate a elevated alcohol level, he also has a mild respiratory acidosis, pH of 7.32, CO2 of 54.  Patient initially was mildly hypertensive here in the ER, this improved during his stay.  He metabolized his alcohol and kratom intoxication to clinical sobriety, was able to ambulate in the ER without ataxia, was able to carry out a linear and coherent conversation, and able to eat without vomiting.  At this time, I feel the patient is stable for discharge, low suspicion for cardiac or neurologic cause for the patient's symptoms today, likely substance induced.  He was discharged with strict return precautions.  - Monitored for any changes in stability or symptomatology. Patient remained stable.   - Counseled regarding labs, imaging, diagnosis, and plan. Patient was agreeable. All questions were answered. The patient was receptive and agreeable to the plan of care.   -The patient was instructed to return to the emergency department if any symptoms  recurred, worsened, or if there were any additional concerns.    *Disclaimer: This note was dictated by speech recognition. Minor errors in transcription may be present. Please call with questions.    Magdiel Jackson MD             Your medication list      You have not been prescribed any medications.         Procedure  Procedures     *This report was transcribed using voice recognition software.  Every effort was made to ensure accuracy; however, inadvertent computerized transcription errors may be present.*  Ben Jackson MD  06/01/25           [1] No past medical history on file.  [2] No past surgical history on file.  [3] No family history on file.  [4]   Social History  Tobacco Use    Smoking status: Not on file    Smokeless tobacco: Not on file   Substance Use Topics    Alcohol use: Not on file    Drug use: Not on file        Ben Jackson MD  06/01/25 2349     86.2
